# Patient Record
Sex: MALE | Race: BLACK OR AFRICAN AMERICAN | Employment: OTHER | URBAN - METROPOLITAN AREA
[De-identification: names, ages, dates, MRNs, and addresses within clinical notes are randomized per-mention and may not be internally consistent; named-entity substitution may affect disease eponyms.]

---

## 2018-09-06 ENCOUNTER — APPOINTMENT (OUTPATIENT)
Dept: CT IMAGING | Age: 63
DRG: 101 | End: 2018-09-06
Attending: EMERGENCY MEDICINE
Payer: MEDICARE

## 2018-09-06 ENCOUNTER — APPOINTMENT (OUTPATIENT)
Dept: GENERAL RADIOLOGY | Age: 63
DRG: 101 | End: 2018-09-06
Attending: EMERGENCY MEDICINE
Payer: MEDICARE

## 2018-09-06 ENCOUNTER — HOSPITAL ENCOUNTER (INPATIENT)
Age: 63
LOS: 1 days | Discharge: HOME | DRG: 101 | End: 2018-09-08
Attending: EMERGENCY MEDICINE | Admitting: INTERNAL MEDICINE
Payer: MEDICARE

## 2018-09-06 DIAGNOSIS — R56.9 SEIZURE (HCC): ICD-10-CM

## 2018-09-06 DIAGNOSIS — R41.82 ALTERED MENTAL STATUS, UNSPECIFIED ALTERED MENTAL STATUS TYPE: Primary | ICD-10-CM

## 2018-09-06 DIAGNOSIS — R41.0 DISORIENTATION: ICD-10-CM

## 2018-09-06 PROBLEM — I10 HYPERTENSION: Status: ACTIVE | Noted: 2018-09-06

## 2018-09-06 LAB
ALBUMIN SERPL-MCNC: 3.4 G/DL (ref 3.5–5)
ALBUMIN/GLOB SERPL: 0.8 {RATIO} (ref 1.1–2.2)
ALP SERPL-CCNC: 110 U/L (ref 45–117)
ALT SERPL-CCNC: 69 U/L (ref 12–78)
AMPHET UR QL SCN: NEGATIVE
ANION GAP SERPL CALC-SCNC: 6 MMOL/L (ref 5–15)
APAP SERPL-MCNC: <2 UG/ML (ref 10–30)
APPEARANCE UR: CLEAR
AST SERPL-CCNC: 67 U/L (ref 15–37)
ATRIAL RATE: 60 BPM
BARBITURATES UR QL SCN: POSITIVE
BASOPHILS # BLD: 0 K/UL (ref 0–0.1)
BASOPHILS NFR BLD: 0 % (ref 0–1)
BENZODIAZ UR QL: NEGATIVE
BILIRUB SERPL-MCNC: 0.2 MG/DL (ref 0.2–1)
BILIRUB UR QL: NEGATIVE
BUN SERPL-MCNC: 32 MG/DL (ref 6–20)
BUN/CREAT SERPL: 29 (ref 12–20)
CALCIUM SERPL-MCNC: 8.7 MG/DL (ref 8.5–10.1)
CALCULATED P AXIS, ECG09: 44 DEGREES
CALCULATED R AXIS, ECG10: -11 DEGREES
CALCULATED T AXIS, ECG11: 15 DEGREES
CANNABINOIDS UR QL SCN: NEGATIVE
CHLORIDE SERPL-SCNC: 105 MMOL/L (ref 97–108)
CO2 SERPL-SCNC: 28 MMOL/L (ref 21–32)
COCAINE UR QL SCN: NEGATIVE
COLOR UR: NORMAL
COMMENT, HOLDF: NORMAL
CREAT SERPL-MCNC: 1.1 MG/DL (ref 0.7–1.3)
DIAGNOSIS, 93000: NORMAL
DIFFERENTIAL METHOD BLD: ABNORMAL
DRUG SCRN COMMENT,DRGCM: ABNORMAL
EOSINOPHIL # BLD: 0.2 K/UL (ref 0–0.4)
EOSINOPHIL NFR BLD: 4 % (ref 0–7)
ERYTHROCYTE [DISTWIDTH] IN BLOOD BY AUTOMATED COUNT: 13.5 % (ref 11.5–14.5)
ETHANOL SERPL-MCNC: <10 MG/DL
GLOBULIN SER CALC-MCNC: 4.4 G/DL (ref 2–4)
GLUCOSE BLD STRIP.AUTO-MCNC: 103 MG/DL (ref 65–100)
GLUCOSE SERPL-MCNC: 93 MG/DL (ref 65–100)
GLUCOSE UR STRIP.AUTO-MCNC: NEGATIVE MG/DL
HCT VFR BLD AUTO: 35.1 % (ref 36.6–50.3)
HGB BLD-MCNC: 11.2 G/DL (ref 12.1–17)
HGB UR QL STRIP: NEGATIVE
IMM GRANULOCYTES # BLD: 0 K/UL (ref 0–0.04)
IMM GRANULOCYTES NFR BLD AUTO: 0 % (ref 0–0.5)
INR PPP: 1 (ref 0.9–1.1)
KETONES UR QL STRIP.AUTO: NEGATIVE MG/DL
LEUKOCYTE ESTERASE UR QL STRIP.AUTO: NEGATIVE
LYMPHOCYTES # BLD: 0.9 K/UL (ref 0.8–3.5)
LYMPHOCYTES NFR BLD: 22 % (ref 12–49)
MAGNESIUM SERPL-MCNC: 2.1 MG/DL (ref 1.6–2.4)
MCH RBC QN AUTO: 29.4 PG (ref 26–34)
MCHC RBC AUTO-ENTMCNC: 31.9 G/DL (ref 30–36.5)
MCV RBC AUTO: 92.1 FL (ref 80–99)
METHADONE UR QL: NEGATIVE
MONOCYTES # BLD: 0.8 K/UL (ref 0–1)
MONOCYTES NFR BLD: 21 % (ref 5–13)
NEUTS SEG # BLD: 2 K/UL (ref 1.8–8)
NEUTS SEG NFR BLD: 53 % (ref 32–75)
NITRITE UR QL STRIP.AUTO: NEGATIVE
NRBC # BLD: 0 K/UL (ref 0–0.01)
NRBC BLD-RTO: 0 PER 100 WBC
OPIATES UR QL: NEGATIVE
P-R INTERVAL, ECG05: 136 MS
PCP UR QL: NEGATIVE
PH UR STRIP: 5 [PH] (ref 5–8)
PHENYTOIN SERPL-MCNC: 2.8 UG/ML (ref 10–20)
PLATELET # BLD AUTO: 174 K/UL (ref 150–400)
PMV BLD AUTO: 10.4 FL (ref 8.9–12.9)
POTASSIUM SERPL-SCNC: 3.8 MMOL/L (ref 3.5–5.1)
PROT SERPL-MCNC: 7.8 G/DL (ref 6.4–8.2)
PROT UR STRIP-MCNC: NEGATIVE MG/DL
PROTHROMBIN TIME: 9.8 SEC (ref 9–11.1)
Q-T INTERVAL, ECG07: 434 MS
QRS DURATION, ECG06: 78 MS
QTC CALCULATION (BEZET), ECG08: 434 MS
RBC # BLD AUTO: 3.81 M/UL (ref 4.1–5.7)
RBC MORPH BLD: ABNORMAL
SALICYLATES SERPL-MCNC: <1.7 MG/DL (ref 2.8–20)
SAMPLES BEING HELD,HOLD: NORMAL
SERVICE CMNT-IMP: ABNORMAL
SODIUM SERPL-SCNC: 139 MMOL/L (ref 136–145)
SP GR UR REFRACTOMETRY: 1.01 (ref 1–1.03)
TROPONIN I SERPL-MCNC: <0.05 NG/ML
TSH SERPL DL<=0.05 MIU/L-ACNC: 1.81 UIU/ML (ref 0.36–3.74)
UROBILINOGEN UR QL STRIP.AUTO: 0.2 EU/DL (ref 0.2–1)
VENTRICULAR RATE, ECG03: 60 BPM
WBC # BLD AUTO: 3.9 K/UL (ref 4.1–11.1)

## 2018-09-06 PROCEDURE — 86922 COMPATIBILITY TEST ANTIGLOB: CPT | Performed by: EMERGENCY MEDICINE

## 2018-09-06 PROCEDURE — 80185 ASSAY OF PHENYTOIN TOTAL: CPT | Performed by: EMERGENCY MEDICINE

## 2018-09-06 PROCEDURE — 70450 CT HEAD/BRAIN W/O DYE: CPT

## 2018-09-06 PROCEDURE — 85025 COMPLETE CBC W/AUTO DIFF WBC: CPT | Performed by: EMERGENCY MEDICINE

## 2018-09-06 PROCEDURE — 99218 HC RM OBSERVATION: CPT

## 2018-09-06 PROCEDURE — 86921 COMPATIBILITY TEST INCUBATE: CPT | Performed by: EMERGENCY MEDICINE

## 2018-09-06 PROCEDURE — 85610 PROTHROMBIN TIME: CPT | Performed by: EMERGENCY MEDICINE

## 2018-09-06 PROCEDURE — 80307 DRUG TEST PRSMV CHEM ANLYZR: CPT | Performed by: EMERGENCY MEDICINE

## 2018-09-06 PROCEDURE — 86900 BLOOD TYPING SEROLOGIC ABO: CPT | Performed by: EMERGENCY MEDICINE

## 2018-09-06 PROCEDURE — 84484 ASSAY OF TROPONIN QUANT: CPT | Performed by: EMERGENCY MEDICINE

## 2018-09-06 PROCEDURE — 96374 THER/PROPH/DIAG INJ IV PUSH: CPT

## 2018-09-06 PROCEDURE — 71045 X-RAY EXAM CHEST 1 VIEW: CPT

## 2018-09-06 PROCEDURE — 74011250636 HC RX REV CODE- 250/636: Performed by: EMERGENCY MEDICINE

## 2018-09-06 PROCEDURE — 86905 BLOOD TYPING RBC ANTIGENS: CPT | Performed by: EMERGENCY MEDICINE

## 2018-09-06 PROCEDURE — 36415 COLL VENOUS BLD VENIPUNCTURE: CPT | Performed by: EMERGENCY MEDICINE

## 2018-09-06 PROCEDURE — 84443 ASSAY THYROID STIM HORMONE: CPT

## 2018-09-06 PROCEDURE — 93005 ELECTROCARDIOGRAM TRACING: CPT

## 2018-09-06 PROCEDURE — 86920 COMPATIBILITY TEST SPIN: CPT | Performed by: EMERGENCY MEDICINE

## 2018-09-06 PROCEDURE — 82962 GLUCOSE BLOOD TEST: CPT

## 2018-09-06 PROCEDURE — 99285 EMERGENCY DEPT VISIT HI MDM: CPT

## 2018-09-06 PROCEDURE — 81003 URINALYSIS AUTO W/O SCOPE: CPT | Performed by: EMERGENCY MEDICINE

## 2018-09-06 PROCEDURE — 86870 RBC ANTIBODY IDENTIFICATION: CPT | Performed by: EMERGENCY MEDICINE

## 2018-09-06 PROCEDURE — 80053 COMPREHEN METABOLIC PANEL: CPT | Performed by: EMERGENCY MEDICINE

## 2018-09-06 PROCEDURE — 83735 ASSAY OF MAGNESIUM: CPT | Performed by: EMERGENCY MEDICINE

## 2018-09-06 RX ORDER — LORAZEPAM 2 MG/ML
2 INJECTION INTRAMUSCULAR
Status: DISCONTINUED | OUTPATIENT
Start: 2018-09-06 | End: 2018-09-08 | Stop reason: HOSPADM

## 2018-09-06 RX ORDER — PHENYTOIN SODIUM 50 MG/ML
INJECTION, SOLUTION INTRAMUSCULAR; INTRAVENOUS
Status: DISPENSED
Start: 2018-09-06 | End: 2018-09-07

## 2018-09-06 RX ORDER — SODIUM CHLORIDE 0.9 % (FLUSH) 0.9 %
5-10 SYRINGE (ML) INJECTION EVERY 8 HOURS
Status: DISCONTINUED | OUTPATIENT
Start: 2018-09-06 | End: 2018-09-08 | Stop reason: HOSPADM

## 2018-09-06 RX ORDER — PHENYTOIN SODIUM 100 MG/1
300 CAPSULE, EXTENDED RELEASE ORAL
COMMUNITY
End: 2018-09-08

## 2018-09-06 RX ORDER — ENOXAPARIN SODIUM 100 MG/ML
40 INJECTION SUBCUTANEOUS EVERY 24 HOURS
Status: DISCONTINUED | OUTPATIENT
Start: 2018-09-06 | End: 2018-09-08 | Stop reason: HOSPADM

## 2018-09-06 RX ORDER — NALOXONE HYDROCHLORIDE 1 MG/ML
1 INJECTION INTRAMUSCULAR; INTRAVENOUS; SUBCUTANEOUS
Status: COMPLETED | OUTPATIENT
Start: 2018-09-06 | End: 2018-09-06

## 2018-09-06 RX ORDER — FUROSEMIDE 20 MG/1
20 TABLET ORAL DAILY
COMMUNITY
End: 2018-09-08

## 2018-09-06 RX ORDER — METOPROLOL TARTRATE 25 MG/1
TABLET, FILM COATED ORAL 2 TIMES DAILY
Status: ON HOLD | COMMUNITY
End: 2018-09-07 | Stop reason: DRUGHIGH

## 2018-09-06 RX ORDER — SODIUM CHLORIDE 0.9 % (FLUSH) 0.9 %
5-10 SYRINGE (ML) INJECTION AS NEEDED
Status: DISCONTINUED | OUTPATIENT
Start: 2018-09-06 | End: 2018-09-07 | Stop reason: SDUPTHER

## 2018-09-06 RX ORDER — PHENYTOIN SODIUM 100 MG/1
100 CAPSULE, EXTENDED RELEASE ORAL DAILY
Status: DISCONTINUED | OUTPATIENT
Start: 2018-09-07 | End: 2018-09-07

## 2018-09-06 RX ORDER — ATORVASTATIN CALCIUM 40 MG/1
40 TABLET, FILM COATED ORAL DAILY
Status: DISCONTINUED | OUTPATIENT
Start: 2018-09-07 | End: 2018-09-08 | Stop reason: HOSPADM

## 2018-09-06 RX ORDER — METOPROLOL TARTRATE 25 MG/1
25 TABLET, FILM COATED ORAL 2 TIMES DAILY
Status: DISCONTINUED | OUTPATIENT
Start: 2018-09-07 | End: 2018-09-07 | Stop reason: DRUGHIGH

## 2018-09-06 RX ORDER — ONDANSETRON 2 MG/ML
4 INJECTION INTRAMUSCULAR; INTRAVENOUS
Status: DISCONTINUED | OUTPATIENT
Start: 2018-09-06 | End: 2018-09-08 | Stop reason: HOSPADM

## 2018-09-06 RX ORDER — SIMVASTATIN 20 MG/1
20 TABLET, FILM COATED ORAL
COMMUNITY
End: 2018-09-08

## 2018-09-06 RX ORDER — PHENYTOIN SODIUM 50 MG/ML
750 INJECTION, SOLUTION INTRAMUSCULAR; INTRAVENOUS ONCE
Status: DISCONTINUED | OUTPATIENT
Start: 2018-09-06 | End: 2018-09-06

## 2018-09-06 RX ORDER — SODIUM CHLORIDE 0.9 % (FLUSH) 0.9 %
5-10 SYRINGE (ML) INJECTION AS NEEDED
Status: DISCONTINUED | OUTPATIENT
Start: 2018-09-06 | End: 2018-09-08 | Stop reason: HOSPADM

## 2018-09-06 RX ORDER — ACETAMINOPHEN 325 MG/1
650 TABLET ORAL
Status: DISCONTINUED | OUTPATIENT
Start: 2018-09-06 | End: 2018-09-08 | Stop reason: HOSPADM

## 2018-09-06 RX ADMIN — Medication 10 ML: at 23:25

## 2018-09-06 RX ADMIN — ENOXAPARIN SODIUM 40 MG: 100 INJECTION SUBCUTANEOUS at 23:25

## 2018-09-06 RX ADMIN — PHENYTOIN SODIUM 750 MG: 50 INJECTION INTRAMUSCULAR; INTRAVENOUS at 22:17

## 2018-09-06 RX ADMIN — NALOXONE HYDROCHLORIDE 1 MG: 1 INJECTION PARENTERAL at 21:13

## 2018-09-06 RX ADMIN — NALOXONE HYDROCHLORIDE 1 MG: 1 INJECTION PARENTERAL at 20:55

## 2018-09-06 NOTE — ED TRIAGE NOTES
Pt's family members dropped off pt stating that he has been slow to respond to questions and weak since yesterday, concerned he had a seizure. Pt weak and slightly confused in triage.

## 2018-09-06 NOTE — ED PROVIDER NOTES
EMERGENCY DEPARTMENT HISTORY AND PHYSICAL EXAM 
 
 
Date: 9/6/2018 Patient Name: Sujata Radford History of Presenting Illness Chief Complaint Patient presents with  Altered mental status History Provided By: Patient and Patient's Sister HPI: Sujata Radford, 70 y.o. male with PMHx significant for seizures, high cholesterol, and HTN, presents ambulatory to the ED with cc of a new onset AMS alongside generalized body weakness, HA, and off balance walking since yesterday. The pt's sister thinks that the pt may have experienced a stroke sometime yesterday. The pt's sister reports that the pt was released from half-way on 7/9/18 after serving 22 years at [facility]. The pt's sister states that the pt was fine upon release, and reports that it was not until yesterday that the pt began acting differently. The pt's sister states that the pt has become confused since then and keeps calling her other people's names. Additionally, the pt's sister notes that the pt keeps calling for his dead wife. The pt's sister also notes that the pt's speech has been delayed. The pt's sister reports that the pt's mental status is currently different than baseline. The pt's sister states that the pt's off balance walking became worse today. The pt's sister reports that the pt was complaining of a HA earlier. The pt's sister states that the pt has been taking his regular medications daily and has not taken any new medications recently. The pt's sister reports that the pt has endorsed taking ASA. The pt's sister notes that the pt has a plate in his head. The pt denies experiencing N/V/D or fever. PMHx: seizures, high cholesterol, and HTN 
PSHx: plate in head SHx: - EtOH, - tobacco, - illicit drugs The HPI is limited due to the a change in the pt's mental status. PCP: None Past History Past Medical History: 
Past Medical History:  
Diagnosis Date  High cholesterol  HTN (hypertension)  Seizure ([address] Past Surgical History: 
History reviewed. No pertinent surgical history. Family History: 
History reviewed. No pertinent family history. Social History: 
Social History Substance Use Topics  Smoking status: Unknown If Ever Smoked  Smokeless tobacco: None  Alcohol use None Allergies: 
No Known Allergies Review of Systems Review of Systems Unable to perform ROS: Mental status change Physical Exam  
Physical Exam  
Constitutional: He appears well-developed and well-nourished. HENT:  
Head: Normocephalic and atraumatic. Eyes: Conjunctivae and EOM are normal. Pupils are unequal (The R pupil is 1 mm and the L pupil is 3-4 mm). Neck: Neck supple. Cardiovascular: Normal rate, regular rhythm and intact distal pulses. Pulmonary/Chest: Effort normal and breath sounds normal. No accessory muscle usage. No respiratory distress. Abdominal: Soft. Normal appearance. There is no tenderness. Musculoskeletal: Normal range of motion. Right upper leg: He exhibits no edema. Left upper leg: He exhibits no edema. Right lower leg: He exhibits no edema. Left lower leg: He exhibits no edema. Neurological: He is alert. A&O x 1 No facial droop.  strength is equal bilaterally. Lifts both legs off of the bed, but not against resistance. Skin: Skin is warm and dry. No rash noted. Psychiatric:  
Mumbling his  wife's name. Nursing note and vitals reviewed. Diagnostic Study Results Labs - Recent Results (from the past 12 hour(s)) GLUCOSE, POC Collection Time: 18  7:06 PM  
Result Value Ref Range Glucose (POC) 103 (H) 65 - 100 mg/dL Performed by Hu Re SAMPLES BEING HELD Collection Time: 18  7:11 PM  
Result Value Ref Range SAMPLES BEING HELD 1sst,1BLU,1LAV,1DKGRN,1PST,1PINK COMMENT   Add-on orders for these samples will be processed based on acceptable specimen integrity and analyte stability, which may vary by analyte. TYPE & SCREEN Collection Time: 09/06/18  7:11 PM  
Result Value Ref Range Crossmatch Expiration 09/09/2018 ABO/Rh(D) B POSITIVE Antibody screen POS Antibody ID ANTI-M   
 ANTIGEN TYPING M NEGATIVE, PROTHROMBIN TIME + INR Collection Time: 09/06/18  7:11 PM  
Result Value Ref Range INR 1.0 0.9 - 1.1 Prothrombin time 9.8 9.0 - 11.1 sec CBC WITH AUTOMATED DIFF Collection Time: 09/06/18  7:11 PM  
Result Value Ref Range WBC 3.9 (L) 4.1 - 11.1 K/uL  
 RBC 3.81 (L) 4.10 - 5.70 M/uL  
 HGB 11.2 (L) 12.1 - 17.0 g/dL HCT 35.1 (L) 36.6 - 50.3 % MCV 92.1 80.0 - 99.0 FL  
 MCH 29.4 26.0 - 34.0 PG  
 MCHC 31.9 30.0 - 36.5 g/dL  
 RDW 13.5 11.5 - 14.5 % PLATELET 677 553 - 309 K/uL MPV 10.4 8.9 - 12.9 FL  
 NRBC 0.0 0  WBC ABSOLUTE NRBC 0.00 0.00 - 0.01 K/uL NEUTROPHILS 53 32 - 75 % LYMPHOCYTES 22 12 - 49 % MONOCYTES 21 (H) 5 - 13 % EOSINOPHILS 4 0 - 7 % BASOPHILS 0 0 - 1 % IMMATURE GRANULOCYTES 0 0.0 - 0.5 % ABS. NEUTROPHILS 2.0 1.8 - 8.0 K/UL  
 ABS. LYMPHOCYTES 0.9 0.8 - 3.5 K/UL  
 ABS. MONOCYTES 0.8 0.0 - 1.0 K/UL  
 ABS. EOSINOPHILS 0.2 0.0 - 0.4 K/UL  
 ABS. BASOPHILS 0.0 0.0 - 0.1 K/UL  
 ABS. IMM. GRANS. 0.0 0.00 - 0.04 K/UL  
 DF SMEAR SCANNED    
 RBC COMMENTS NORMOCYTIC, NORMOCHROMIC METABOLIC PANEL, COMPREHENSIVE Collection Time: 09/06/18  7:11 PM  
Result Value Ref Range Sodium 139 136 - 145 mmol/L Potassium 3.8 3.5 - 5.1 mmol/L Chloride 105 97 - 108 mmol/L  
 CO2 28 21 - 32 mmol/L Anion gap 6 5 - 15 mmol/L Glucose 93 65 - 100 mg/dL BUN 32 (H) 6 - 20 MG/DL Creatinine 1.10 0.70 - 1.30 MG/DL  
 BUN/Creatinine ratio 29 (H) 12 - 20 GFR est AA >60 >60 ml/min/1.73m2 GFR est non-AA >60 >60 ml/min/1.73m2 Calcium 8.7 8.5 - 10.1 MG/DL  Bilirubin, total 0.2 0.2 - 1.0 MG/DL  
 ALT (SGPT) 69 12 - 78 U/L  
 AST (SGOT) 67 (H) 15 - 37 U/L  
 Alk. phosphatase 110 45 - 117 U/L Protein, total 7.8 6.4 - 8.2 g/dL Albumin 3.4 (L) 3.5 - 5.0 g/dL Globulin 4.4 (H) 2.0 - 4.0 g/dL A-G Ratio 0.8 (L) 1.1 - 2.2 MAGNESIUM Collection Time: 09/06/18  7:11 PM  
Result Value Ref Range Magnesium 2.1 1.6 - 2.4 mg/dL TROPONIN I Collection Time: 09/06/18  7:11 PM  
Result Value Ref Range Troponin-I, Qt. <0.05 <0.05 ng/mL TSH 3RD GENERATION Collection Time: 09/06/18  7:11 PM  
Result Value Ref Range TSH 1.81 0.36 - 3.74 uIU/mL DRUG SCREEN, URINE Collection Time: 09/06/18  7:32 PM  
Result Value Ref Range AMPHETAMINES NEGATIVE  NEG    
 BARBITURATES POSITIVE (A) NEG BENZODIAZEPINES NEGATIVE  NEG    
 COCAINE NEGATIVE  NEG METHADONE NEGATIVE  NEG    
 OPIATES NEGATIVE  NEG    
 PCP(PHENCYCLIDINE) NEGATIVE  NEG    
 THC (TH-CANNABINOL) NEGATIVE  NEG Drug screen comment (NOTE) URINALYSIS W/ RFLX MICROSCOPIC Collection Time: 09/06/18  7:32 PM  
Result Value Ref Range Color STRAW Appearance CLEAR CLEAR Specific gravity 1.010 1.003 - 1.030    
 pH (UA) 5.0 5.0 - 8.0 Protein NEGATIVE  NEG mg/dL Glucose NEGATIVE  NEG mg/dL Ketone NEGATIVE  NEG mg/dL Bilirubin NEGATIVE  NEG Blood NEGATIVE  NEG Urobilinogen 0.2 0.2 - 1.0 EU/dL Nitrites NEGATIVE  NEG Leukocyte Esterase NEGATIVE  NEG    
EKG, 12 LEAD, INITIAL Collection Time: 09/06/18  7:39 PM  
Result Value Ref Range Ventricular Rate 60 BPM  
 Atrial Rate 60 BPM  
 P-R Interval 136 ms QRS Duration 78 ms Q-T Interval 434 ms QTC Calculation (Bezet) 434 ms Calculated P Axis 44 degrees Calculated R Axis -11 degrees Calculated T Axis 15 degrees Diagnosis Normal sinus rhythm with sinus arrhythmia Left axis deviation 
minimal changes of early repolarization in the precordium Confirmed by Anil Cancino MD, [facility] ([id number]) on 9/6/2018 9:16:24 PM 
  
ACETAMINOPHEN  Collection Time: 09/06/18  7:42 PM  
 Result Value Ref Range Acetaminophen level <2 (L) 10 - 30 ug/mL PHENYTOIN Collection Time: 09/06/18  7:42 PM  
Result Value Ref Range Phenytoin 2.8 (L) 10.0 - 20.0 ug/mL SALICYLATE Collection Time: 09/06/18  7:42 PM  
Result Value Ref Range Salicylate level <3.3 (L) 2.8 - 20.0 MG/DL  
ETHYL ALCOHOL Collection Time: 09/06/18  7:42 PM  
Result Value Ref Range ALCOHOL(ETHYL),SERUM <10 <10 MG/DL Radiologic Studies -  
CT Results  (Last 48 hours) 09/06/18 1940  CT HEAD WO CONT Final result Impression:  IMPRESSION:   
   
Chronic encephalomalacia in the anterior left frontal lobe. No acute intracranial process is identified. Narrative:  EXAM:  CT HEAD WO CONT Clinical history: Decreased alertness INDICATION:   Decreased alertness; anisocoria COMPARISON: None. CONTRAST:  None. TECHNIQUE: Unenhanced CT of the head was performed using 5 mm images. Brain and  
bone windows were generated. CT dose reduction was achieved through use of a  
standardized protocol tailored for this examination and automatic exposure  
control for dose modulation. FINDINGS:  
Encephalomalacia in the left frontal lobe is related to remote infarction/trauma [place] [facility] left frontal craniotomy. . There is no intracranial hemorrhage,  
extra-axial collection, mass, mass effect or midline shift. The basilar  
cisterns are open. No acute infarct is identified. The bone windows demonstrate  
no abnormalities. The visualized portions of the paranasal sinuses and mastoid  
air cells are clear. CXR Results  (Last 48 hours) 09/06/18 1940  XR CHEST PORT Final result Impression:  IMPRESSION:  
No acute process. Narrative:  PORTABLE CHEST RADIOGRAPH/S: 9/6/2018 7:40 PM  
   
Clinical history: Altered mental status INDICATION:   ams COMPARISON: None FINDINGS:  
 AP portable upright view of the chest demonstrates normal cardiopericardial  
silhouette. The lungs are adequately expanded. There is no edema, effusion,  
consolidation, or pneumothorax. The osseous structures are unremarkable. Patient  
is on a cardiac monitor. Medical Decision Making I am the first provider for this patient. I reviewed the vital signs, available nursing notes, past medical history, past surgical history, family history and social history. Vital Signs-Reviewed the patient's vital signs. Patient Vitals for the past 12 hrs: 
 Temp Pulse Resp BP SpO2  
09/06/18 2322 98.6 °F (37 °C) 64 18 109/72 96 % 09/06/18 2223 98.8 °F (37.1 °C) - - - -  
09/06/18 2200 - 60 14 107/73 98 % 09/06/18 2100 - (!) 59 18 120/69 99 % 09/06/18 2030 - (!) 58 22 120/65 100 % 09/06/18 1945 - - - 114/73 -  
09/06/18 1907 - - - 125/80 -  
09/06/18 1904 98.6 °F (37 °C) (!) 58 18 125/80 99 % Pulse Oximetry Analysis - 99% on RA Cardiac Monitor:  
Rate: 58 bpm 
Rhythm: Sinus bradycardia EKG interpretation: (Preliminary) (19:39:05) Rhythm: normal sinus rhythm; and regular . Rate (approx.): 60; Axis: normal; ND interval: normal; QRS interval: normal ; ST/T wave: normal; Other findings: normal. 
Written by Nura Ortega ED Scribe, as dictated by Andrew Hernández. Karlos Heimlich, MD . Records Reviewed: Nursing Notes and Old Medical Records Provider Notes (Medical Decision Making): DDx: prolonged postictal state, intracranial hemorrhage, CVA, electrolyte abnormality, sepsis, ingestion/drug use ED Course:  
Initial assessment performed. The patients presenting problems have been discussed, and they are in agreement with the care plan formulated and outlined with them. I have encouraged them to ask questions as they arise throughout their visit. PROGRESS NOTE: 
9:14 PM 
The pt is now A&O x 3 but is not yet fully back to baseline. Written by Marita Mckinley ED Scribe, as dictated by Deb Salamanca. Tory Michaels MD. 
 
CONSULT NOTE:  
9:54 PM 
Deb Salamanca. Tory Michaels MD  spoke with Dr. Ami Adam, Specialty: Hospitalist 
Discussed pt's hx, disposition, and available diagnostic and imaging results. Reviewed care plans. Consultant will evaluate pt for admission. Written by Marita Mckinley ED Scribe, as dictated by Deb Salamanca. Tory Michaels MD . Critical Care Time: 0 minutes Disposition: 
Admit: 
9:54 PM 
Patient is being admitted to the hospital by Dr. Ami Adam. The results of their tests and reasons for their admission have been discussed with them and/or available family. They convey agreement and understanding for the need to be admitted and for their admission diagnosis. Consultation has been made with the inpatient physician specialist for hospitalization. Written by Marita Mckinley ED Scribe, as dictated by Deb Salamanca. Tory Michaels MD . PLAN: 
1. Admit to the hospital.   
Diagnosis Clinical Impression: 1. Altered mental status, unspecified altered mental status type Attestation: This note is prepared by Marita Mckinley, acting as Scribe for Deb Salamanca. Tory Michaels MD.  
 
Deb Salamanca. Tory Michaels MD: The scribe's documentation has been prepared under my direction and personally reviewed by me in its entirety. I confirm that the note above accurately reflects all work, treatment, procedures, and medical decision making performed by me.

## 2018-09-06 NOTE — ED NOTES
Pt arrived to ED with family members with c/o altered mental status. Per family members, pt started to be confused last night. Family members stated that he was complaining of a headache. Pt is O&AX2. Pt is unsure of time and place. Pt answers questions but speech is delayed. Family members report a PMH of seizures, high cholesterol, and HTN. Pt is drowsy but is easily aroused to voice. Skin is intact; lungs are clear; pt breathes well on room air; Pt is in no acute distress. Will continue to monitor. See nursing assessment. Safety precautions in place; call light within reach. Seizure pads in place. RN assessment is limited d/t pt condition. Emergency Department Nursing Plan of Care The Nursing Plan of Care is developed from the Nursing assessment and Emergency Department Attending provider initial evaluation. The plan of care may be reviewed in the ED Provider note. The Plan of Care was developed with the following considerations:  
Patient / Family readiness to learn indicated by:verbalized understanding Persons(s) to be included in education: patient and family Barriers to Learning/Limitations:No 
 
Signed Lin Kellogg, MIREYA   
9/6/2018   7:15 PM

## 2018-09-06 NOTE — IP AVS SNAPSHOT
[address] [facility] 
 
 
 [address] [facility] Patient: Anneliese Araujo MRN: [id number] SWK:[birth year] About your hospitalization You were admitted on:  September 6, 2018 You last received care in the:  [address] You were discharged on:  September 8, 2018 Why you were hospitalized Your primary diagnosis was: Altered Mental Status Your diagnoses also included:  Hypertension, Seizure (Hcc), Altered Mental State Follow-up Information Follow up With Details Comments Contact Info Eugenio Shanks MD   [address] [facility] [address] [facility] 
[phone] Christiano Kapadia MD   [facility] [address] [facility] [address] 
[phone] None   None (395) Patient stated that they have no PCP Discharge Orders None A check missael indicates which time of day the medication should be taken. My Medications START taking these medications Instructions Each Dose to Equal  
 Morning Noon Evening Bedtime  
 aspirin 325 mg tablet Commonly known as:  ASPIRIN Start taking on:  9/9/2018 Your next dose is:  Tomorrow 09/09/18 Take 1 Tab by mouth daily for 30 days. 325 mg  
    
   
   
   
  
 atorvastatin 40 mg tablet Commonly known as:  LIPITOR Start taking on:  9/9/2018 Replaces:  simvastatin 20 mg tablet Your next dose is:  Tomorrow 09/09/18 Take 1 Tab by mouth daily for 30 days. 40 mg  
    
   
   
   
  
 divalproex  mg ER tablet Commonly known as:  DEPAKOTE ER Your next dose is: Tonight 09/08/18 Take 2 Tabs by mouth nightly for 30 days. 1000 mg CHANGE how you take these medications Instructions Each Dose to Equal  
 Morning Noon Evening Bedtime * metoprolol succinate 25 mg XL tablet Commonly known as:  TOPROL-XL What changed:  Another medication with the same name was added.  Make sure you understand how and when to take each. Take 25 mg by mouth daily. 25 mg  
    
   
   
   
  
 * metoprolol succinate 25 mg XL tablet Commonly known as:  TOPROL-XL Start taking on:  9/9/2018 What changed: You were already taking a medication with the same name, and this prescription was added. Make sure you understand how and when to take each. Your next dose is:  Tomorrow morning 09/09/18 Take 1 Tab by mouth daily for 30 days. 25 mg  
    
   
   
   
  
 * Notice: This list has 2 medication(s) that are the same as other medications prescribed for you. Read the directions carefully, and ask your doctor or other care provider to review them with you. STOP taking these medications DILANTIN EXTENDED 100 mg ER capsule Generic drug:  phenytoin ER  
   
  
 LASIX 20 mg tablet Generic drug:  furosemide  
   
  
 metoprolol tartrate 25 mg tablet Commonly known as:  LOPRESSOR  
   
  
 simvastatin 20 mg tablet Commonly known as:  ZOCOR Replaced by:  atorvastatin 40 mg tablet Where to Get Your Medications These medications were sent to [address] [place] [address] Phone:  [phone]  
  aspirin 325 mg tablet  
 atorvastatin 40 mg tablet  
 divalproex  mg ER tablet  
 metoprolol succinate 25 mg XL tablet Discharge Instructions None [facility] Announcement We are excited to announce that we are making your provider's discharge notes available to you in [email or web address]. You will see these notes when they are completed and signed by the physician that discharged you from your recent hospital stay. If you have any questions or concerns about any information you see in VocalizeLocalt, please call the Health Information Department where you were seen or reach out to your Primary Care Provider for more information about your plan of care. Introducing [facility]! [employer] introduces Talkwheelt patient portal. Now you can access parts of your medical record, email your doctor's office, and request medication refills online. 1. In your internet browser, go to [email or web address] 2. Click on the First Time User? Click Here link in the Sign In box. You will see the New Member Sign Up page. 3. Enter your Solavista Access Code exactly as it appears below. You will not need to use this code after youve completed the sign-up process. If you do not sign up before the expiration date, you must request a new code. · Solavista Access Code: [email or web address] Expires: 12/5/2018  7:04 PM 
 
4. Enter the last four digits of your Social Security Number (xxxx) and Date of Birth (mm/dd/yyyy) as indicated and click Submit. You will be taken to the next sign-up page. 5. Create a Solavista ID. This will be your Solavista login ID and cannot be changed, so think of one that is secure and easy to remember. 6. Create a Solavista password. You can change your password at any time. 7. Enter your Password Reset Question and Answer. This can be used at a later time if you forget your password. 8. Enter your e-mail address. You will receive e-mail notification when new information is available in [address] 9. Click Sign Up. You can now view and download portions of your medical record. 10. Click the Download Summary menu link to download a portable copy of your medical information. If you have questions, please visit the Frequently Asked Questions section of the Solavista website. Remember, Solavista is NOT to be used for urgent needs. For medical emergencies, dial 911. Now available from your iPhone and Android! Introducing Dexter Rubio As a [employer] patient, I wanted to make you aware of our electronic visit tool called Dexter Rubio. [employer] 24/7 allows you to connect within minutes with a medical provider 24 hours a day, seven days a week via a mobile device or tablet or logging into a secure website from your computer. You can access [facility] from anywhere in the [place]. A virtual visit might be right for you when you have a simple condition and feel like you just dont want to get out of bed, or cant get away from work for an appointment, when your regular [facility] provider is not available (evenings, weekends or holidays), or when youre out of town and need minor care. Electronic visits cost only $49 and if the [employer] 24/Scodix provider determines a prescription is needed to treat your condition, one can be electronically transmitted to a nearby pharmacy*. Please take a moment to enroll today if you have not already done so. The enrollment process is free and takes just a few minutes. To enroll, please download the Eclector kathryn to your tablet or phone, or visit [email or web address] to enroll on your computer. And, as an [address] [place] [address] patient with a Mdundo account, the results of your visits will be scanned into your electronic medical record and your primary care provider will be able to view the scanned results. We urge you to continue to see your regular [facility] provider for your ongoing medical care. And while your primary care provider may not be the one available when you seek a Dexter Rubio virtual visit, the peace of mind you get from getting a real diagnosis real time can be priceless. For more information on Dexter Humansizedgueritafin, view our Frequently Asked Questions (FAQs) at [email or web address]. Sincerely, 
 
Drew Manuel MD 
Chief Medical Officer Zachary Reynolds *:  certain medications cannot be prescribed via Valence TechnologygueritaMusicplayr Unresulted Labs-Please follow up with your PCP about these lab tests Order Current Status ANGIOTENSIN CONVERTING ENZYME In process VITAMIN D, 1, 25 DIHYDROXY In process Providers Seen During Your Hospitalization Provider Specialty Primary office phone Pearl Arizmendi MD Emergency Medicine [phone] Ronnie Vale MD Emergency Medicine [phone] Kinjal Salgado MD Hospitalist [phone] Your Primary Care Physician (PCP) Primary Care Physician Office Phone Office Fax NONE ** None ** ** None ** You are allergic to the following No active allergies Recent Documentation Height Weight BMI Smoking Status 1.778 m 63 kg 19.94 kg/m2 Unknown If Ever Smoked Emergency Contacts Name Discharge Info Relation Home Work Mobile Analy MéndzeOfe Patient Belongings The following personal items are in your possession at time of discharge: 
  Dental Appliances: Partials, Lowers, At home  Visual Aid: None      Home Medications: None   Jewelry: None  Clothing: At bedside, Pants, Shirt, Socks, Footwear    Other Valuables: None Discharge Instructions Attachments/References DIVALPROEX (BY MOUTH) (ENGLISH) Patient Handouts Divalproex (By mouth) Divalproex Sodium (dye-EDMUND-proe-ex PARKER-ashly-um) Treats seizures. Also treats bipolar disorder and helps prevent migraine headaches. Brand Name(s): Depakote, Depakote ER, Depakote Sprinkles There may be other brand names for this medicine. When This Medicine Should Not Be Used: This medicine is not right for everyone. Do not use it if you had an allergic reaction to divalproex, valproate sodium, valproic acid, if you are pregnant, or if you have certain genetic disorders (such as urea cycle disorder or mitochondrial disorders). How to Use This Medicine:  
Delayed Release Capsule, Delayed Release Tablet, Coated Tablet, Long Acting Tablet · Take your medicine as directed. Your dose may need to be changed several times to find what works best for you. · You may take this medicine with food to decrease stomach upset. · Capsule, tablet, or extended-release tablet: Swallow the medicine whole. Do not crush, break, or chew it. · Sprinkle capsule: You may open the capsule and pour the medicine into a small amount of soft food such as pudding or applesauce. Stir this mixture well and swallow it without chewing. · This medicine should come with a Medication Guide. Ask your pharmacist for a copy if you do not have one. · Missed dose: Take a dose as soon as you remember. If it is almost time for your next dose, wait until then and take a regular dose. Do not take extra medicine to make up for a missed dose. If you miss 2 or more doses, call your doctor. · Store the medicine in a closed container at room temperature, away from heat, moisture, and direct light. Drugs and Foods to Avoid: Ask your doctor or pharmacist before using any other medicine, including over-the-counter medicines, vitamins, and herbal products. · Some medicines can affect how divalproex sodium works. Tell your doctor if you are using any of the following: ¨ Amitriptyline, aspirin, clonazepam, diazepam, nortriptyline, propofol, rifampin, ritonavir, rufinamide, tolbutamide, or zidovudine ¨ Birth control pill ¨ Blood thinner (including warfarin) ¨ Carbapenem antibiotic (including ertapenem, imipenem, meropenem) ¨ Other seizure medicines (including carbamazepine, ethosuximide, felbamate, lamotrigine, phenobarbital, phenytoin, primidone, topiramate) · Alcohol, narcotic pain relievers, or sleeping pills may cause you to feel more lightheaded, dizzy, or faint when used with this medicine. Warnings While Using This Medicine: · It is not safe to take this medicine during pregnancy. It could harm an unborn baby. Tell your doctor right away if you become pregnant.  
· Tell your doctor if you are breastfeeding, or if you have kidney disease, liver disease, a blood disease, or pancreas problems, or a history of depression or mental health problems. · This medicine may cause the following problems: 
¨ Liver problems ¨ Pancreatitis ¨ Hyperammonemic encephalopathy (too much ammonia in your blood) ¨ Depression or thoughts of suicide ¨ Thrombocytopenia (decrease in blood cells that affect clotting) ¨ Hypothermia (low body temperature) ¨ Drug reaction with eosinophilia and systemic symptoms (DRESS), which may damage organs such as the liver, kidney, or heart · This medicine may make you dizzy or drowsy. Do not drive or do anything that could be dangerous until you know how this medicine affects you. · Do not stop using this medicine suddenly. Your doctor will need to slowly decrease your dose before you stop it completely. · Tell any doctor or dentist who treats you that you are using this medicine. This medicine may affect certain medical test results. · Your doctor will check your progress and the effects of this medicine at regular visits. Keep all appointments. · Keep all medicine out of the reach of children. Never share your medicine with anyone. Possible Side Effects While Using This Medicine:  
Call your doctor right away if you notice any of these side effects: · Allergic reaction: Itching or hives, swelling in your face or hands, swelling or tingling in your mouth or throat, chest tightness, trouble breathing · Blistering, peeling, red skin rash · Confusion, problems with memory, unusual drowsiness, clumsiness · Dark urine or pale stools, loss of appetite, stomach pain, yellow skin or eyes · Fever, rash, swollen glands in the neck, armpit, or groin · Sudden and severe stomach pain, nausea, vomiting, lightheadedness · Thoughts of hurting yourself, depression, unusual changes in behavior or moods · Unusual bleeding, bruising, or weakness If you notice these less serious side effects, talk with your doctor: · Diarrhea, stomach upset · Hair loss · Tiredness, sleepiness · Trouble sleeping, tremor · Vision changes, dizziness, headache If you notice other side effects that you think are caused by this medicine, tell your doctor. Call your doctor for medical advice about side effects. You may report side effects to FDA at 1-386-FDA-2081 © 2017 [address] Information is for End User's use only and may not be sold, redistributed or otherwise used for commercial purposes. The above information is an  only. It is not intended as medical advice for individual conditions or treatments. Talk to your doctor, nurse or pharmacist before following any medical regimen to see if it is safe and effective for you. Please provide this summary of care documentation to your next provider. Signatures-by signing, you are acknowledging that this After Visit Summary has been reviewed with you and you have received a copy. Patient Signature:  ____________________________________________________________ Date:  ____________________________________________________________  
  
Anna Marie Johanna Provider Signature:  ____________________________________________________________ Date:  ____________________________________________________________

## 2018-09-07 PROBLEM — R41.82 ALTERED MENTAL STATE: Status: ACTIVE | Noted: 2018-09-07

## 2018-09-07 LAB
ALBUMIN SERPL-MCNC: 3.2 G/DL (ref 3.5–5)
ALBUMIN/GLOB SERPL: 0.7 {RATIO} (ref 1.1–2.2)
ALP SERPL-CCNC: 104 U/L (ref 45–117)
ALT SERPL-CCNC: 81 U/L (ref 12–78)
AMMONIA PLAS-SCNC: 21 UMOL/L
AST SERPL-CCNC: 78 U/L (ref 15–37)
BILIRUB DIRECT SERPL-MCNC: 0.1 MG/DL (ref 0–0.2)
BILIRUB SERPL-MCNC: 0.4 MG/DL (ref 0.2–1)
CHOLEST SERPL-MCNC: 232 MG/DL
CK SERPL-CCNC: 281 U/L (ref 39–308)
CRP SERPL HS-MCNC: 2.1 MG/L
ERYTHROCYTE [SEDIMENTATION RATE] IN BLOOD: 25 MM/HR (ref 0–20)
GLOBULIN SER CALC-MCNC: 4.5 G/DL (ref 2–4)
HCYS SERPL-SCNC: 11.8 UMOL/L (ref 3.7–13.9)
HDLC SERPL-MCNC: 85 MG/DL
HDLC SERPL: 2.7 {RATIO} (ref 0–5)
LDLC SERPL CALC-MCNC: 105.2 MG/DL (ref 0–100)
LIPID PROFILE,FLP: ABNORMAL
PROT SERPL-MCNC: 7.7 G/DL (ref 6.4–8.2)
RPR SER QL: NONREACTIVE
TRIGL SERPL-MCNC: 209 MG/DL (ref ?–150)
VIT B12 SERPL-MCNC: 381 PG/ML (ref 193–986)
VLDLC SERPL CALC-MCNC: 41.8 MG/DL

## 2018-09-07 PROCEDURE — 82550 ASSAY OF CK (CPK): CPT | Performed by: PSYCHIATRY & NEUROLOGY

## 2018-09-07 PROCEDURE — 86141 C-REACTIVE PROTEIN HS: CPT | Performed by: PSYCHIATRY & NEUROLOGY

## 2018-09-07 PROCEDURE — 82140 ASSAY OF AMMONIA: CPT

## 2018-09-07 PROCEDURE — G8979 MOBILITY GOAL STATUS: HCPCS | Performed by: PHYSICAL THERAPIST

## 2018-09-07 PROCEDURE — 74011250637 HC RX REV CODE- 250/637: Performed by: INTERNAL MEDICINE

## 2018-09-07 PROCEDURE — 74011250636 HC RX REV CODE- 250/636: Performed by: EMERGENCY MEDICINE

## 2018-09-07 PROCEDURE — 85652 RBC SED RATE AUTOMATED: CPT | Performed by: PSYCHIATRY & NEUROLOGY

## 2018-09-07 PROCEDURE — 82652 VIT D 1 25-DIHYDROXY: CPT | Performed by: PSYCHIATRY & NEUROLOGY

## 2018-09-07 PROCEDURE — 99218 HC RM OBSERVATION: CPT

## 2018-09-07 PROCEDURE — 36415 COLL VENOUS BLD VENIPUNCTURE: CPT | Performed by: PSYCHIATRY & NEUROLOGY

## 2018-09-07 PROCEDURE — 97116 GAIT TRAINING THERAPY: CPT | Performed by: PHYSICAL THERAPIST

## 2018-09-07 PROCEDURE — 82164 ANGIOTENSIN I ENZYME TEST: CPT | Performed by: PSYCHIATRY & NEUROLOGY

## 2018-09-07 PROCEDURE — 80076 HEPATIC FUNCTION PANEL: CPT | Performed by: PSYCHIATRY & NEUROLOGY

## 2018-09-07 PROCEDURE — G8978 MOBILITY CURRENT STATUS: HCPCS | Performed by: PHYSICAL THERAPIST

## 2018-09-07 PROCEDURE — 74011250637 HC RX REV CODE- 250/637: Performed by: EMERGENCY MEDICINE

## 2018-09-07 PROCEDURE — 86592 SYPHILIS TEST NON-TREP QUAL: CPT | Performed by: PSYCHIATRY & NEUROLOGY

## 2018-09-07 PROCEDURE — 97161 PT EVAL LOW COMPLEX 20 MIN: CPT | Performed by: PHYSICAL THERAPIST

## 2018-09-07 PROCEDURE — 80061 LIPID PANEL: CPT | Performed by: PSYCHIATRY & NEUROLOGY

## 2018-09-07 PROCEDURE — 83090 ASSAY OF HOMOCYSTEINE: CPT | Performed by: PSYCHIATRY & NEUROLOGY

## 2018-09-07 PROCEDURE — 82607 VITAMIN B-12: CPT | Performed by: PSYCHIATRY & NEUROLOGY

## 2018-09-07 PROCEDURE — 65220000003 HC RM SEMIPRIVATE PSYCH

## 2018-09-07 PROCEDURE — 95816 EEG AWAKE AND DROWSY: CPT | Performed by: PSYCHIATRY & NEUROLOGY

## 2018-09-07 RX ORDER — IBUPROFEN 400 MG/1
800 TABLET ORAL
Status: DISCONTINUED | OUTPATIENT
Start: 2018-09-07 | End: 2018-09-08 | Stop reason: HOSPADM

## 2018-09-07 RX ORDER — METOPROLOL SUCCINATE 25 MG/1
25 TABLET, EXTENDED RELEASE ORAL DAILY
Status: DISCONTINUED | OUTPATIENT
Start: 2018-09-08 | End: 2018-09-08 | Stop reason: HOSPADM

## 2018-09-07 RX ORDER — FOLIC ACID 1 MG/1
1 TABLET ORAL DAILY
Status: DISCONTINUED | OUTPATIENT
Start: 2018-09-08 | End: 2018-09-08 | Stop reason: HOSPADM

## 2018-09-07 RX ORDER — LANOLIN ALCOHOL/MO/W.PET/CERES
3 CREAM (GRAM) TOPICAL
Status: DISCONTINUED | OUTPATIENT
Start: 2018-09-07 | End: 2018-09-08 | Stop reason: HOSPADM

## 2018-09-07 RX ORDER — DIVALPROEX SODIUM 500 MG/1
1000 TABLET, EXTENDED RELEASE ORAL
Status: DISCONTINUED | OUTPATIENT
Start: 2018-09-07 | End: 2018-09-08 | Stop reason: HOSPADM

## 2018-09-07 RX ORDER — METOPROLOL SUCCINATE 25 MG/1
25 TABLET, EXTENDED RELEASE ORAL DAILY
COMMUNITY
End: 2018-09-19 | Stop reason: SDUPTHER

## 2018-09-07 RX ORDER — ASPIRIN 325 MG
325 TABLET ORAL DAILY
Status: DISCONTINUED | OUTPATIENT
Start: 2018-09-08 | End: 2018-09-08 | Stop reason: HOSPADM

## 2018-09-07 RX ADMIN — Medication 10 ML: at 06:01

## 2018-09-07 RX ADMIN — ATORVASTATIN CALCIUM 40 MG: 40 TABLET, FILM COATED ORAL at 09:47

## 2018-09-07 RX ADMIN — ENOXAPARIN SODIUM 40 MG: 100 INJECTION SUBCUTANEOUS at 21:17

## 2018-09-07 RX ADMIN — ACETAMINOPHEN 650 MG: 325 TABLET ORAL at 07:43

## 2018-09-07 RX ADMIN — DIVALPROEX SODIUM 1000 MG: 500 TABLET, FILM COATED, EXTENDED RELEASE ORAL at 21:18

## 2018-09-07 RX ADMIN — Medication 10 ML: at 16:49

## 2018-09-07 RX ADMIN — Medication 10 ML: at 21:18

## 2018-09-07 NOTE — PROGRESS NOTES
Problem: Seizure Disorder (Adult) Goal: Interventions Outcome: Progressing Towards Goal 
Loading dose of seizure medication initiated, pt placed on seizure precautions. 3/4 siderails up with rail guards. Pt near nurses station with bed alarm on. Will cont to monitor. Problem: Falls - Risk of 
Goal: *Absence of Falls Document Elizabeth Gregory Fall Risk and appropriate interventions in the flowsheet. Outcome: Progressing Towards Goal 
Fall Risk Interventions: 
Mobility Interventions: Assess mobility with egress test, Bed/chair exit alarm Mentation Interventions: Adequate sleep, hydration, pain control, Door open when patient unattended, Bed/chair exit alarm

## 2018-09-07 NOTE — PROGRESS NOTES
Problem: Mobility Impaired (Adult and Pediatric) Goal: *Acute Goals and Plan of Care (Insert Text) Physical Therapy Goals Initiated 9/7/2018 1. Patient will move from supine to sit and sit to supine  in bed with independence within 7 day(s). 2.  Patient will transfer from bed to chair and chair to bed with supervision/set-up using the least restrictive device within 7 day(s). 3.  Patient will perform sit to stand with supervision/set-up within 7 day(s). 4.  Patient will ambulate with supervision/set-up for 300 feet with the least restrictive device within 7 day(s). 5.  Patient will improve MASCORRO balance scale score by 7 points within 7 days. physical Therapy EVALUATION- neuro population Patient: Lucy Mathew (1 y.o. male) Date: 9/7/2018 Primary Diagnosis: Altered mental status Altered mental state Altered mental state Precautions:   
 
ASSESSMENT : 
Based on the objective data described below, the patient presents with decreased balance, activity tolerance, and overall functional mobility. Patient reports history of left UE/LE weakness. Patient out of bed in chair at start of session. Patient required stand by to contact guard assist for sit to stand transfers. Patient ambulated 200 feet with minimal assistance and no device. Patient started the walk with narrow base of support, slow pace, and decreased stride length. Patient responded well to verbal cues of increasing stride length and increasing base of support. Patient's balance improved as distance increased and as he was able to utilize cues. Patient would benefit from a straight cane for ambulation. He states that his sister has an extra cane that he can use. Patient educated on use of straight cane in right hand with ambulation in order to improve balance; verbalized good understanding. Patient will benefit from skilled intervention to address the above impairments. Patients rehabilitation potential is considered to be Good Factors which may influence rehabilitation potential include:  
[x]           None noted 
[]           Mental ability/status []           Medical condition 
[]           Home/family situation and support systems 
[]           Safety awareness 
[]           Pain tolerance/management 
[]           Other: PLAN : 
Recommendations and Planned Interventions: 
[x]             Bed Mobility Training             [x]      Neuromuscular Re-Education [x]             Transfer Training                   []      Orthotic/Prosthetic Training 
[x]             Gait Training                         []      Modalities [x]             Therapeutic Exercises           []      Edema Management/Control [x]             Therapeutic Activities            [x]      Patient and Family Training/Education []             Other (comment): Frequency/Duration: Patient will be followed by physical therapy 2 times a week to address goals. Discharge Recommendations: Home Health Further Equipment Recommendations for Discharge: Patient reports that his sister has an extra straight cane that he can use. Recommend use of straight cane in R hand to improve balance SUBJECTIVE:  
Patient stated It's always been weaker on my left side.  OBJECTIVE DATA SUMMARY:  
HISTORY:   
Past Medical History:  
Diagnosis Date  High cholesterol  HTN (hypertension)  Seizure ([facility] [address] History reviewed. No pertinent surgical history. Prior Level of Function/Home Situation: Patient resides with his sister in a two story home with 15 steps to the second floor and unilateral handrail. Patient reports history of left UE/LE weakness. No use of assistive device reported. Home Situation Home Environment: Private residence One/Two Story Residence: Two story Living Alone: No 
Support Systems: Family member(s) Patient Expects to be Discharged to[de-identified] Private residence Current DME Used/Available at Home: None EXAMINATION/PRESENTATION/DECISION MAKING:  
Critical Behavior: 
Neurologic State: Alert Orientation Level: Oriented X4 Cognition: Follows commands Hearing: Auditory Auditory Impairment: None Range Of Motion: 
AROM: Within functional limits (except left shoulder) PROM: Within functional limits Strength:   
Strength: Generally decreased, functional (LUE/LLE weaker than right; right UE/LE 5/5) 5/5 RUE/RLE 
3+/5 LUE/LLE Coordination: 
Coordination: Within functional limits Functional Mobility: 
Bed Mobility: 
 Patient sitting out in chair at start of session. Transfers: 
Sit to Stand: Stand-by assistance Stand to Sit: Stand-by assistance Balance:  
Sitting: Intact Standing: Impaired; Without support Standing - Static: Good Standing - Dynamic : Fair Ambulation/Gait Training: 
Distance (ft): 200 Feet (ft) Assistive Device: Gait belt Ambulation - Level of Assistance: Contact guard assistance;Minimal assistance;Assist x1 Gait Description (WDL): Exceptions to [facility] Gait Abnormalities: Decreased step clearance Base of Support: Narrowed Speed/Mari: Slow Step Length: Right shortened;Left shortened Functional Measure Case Balance Test: 
 
Sitting to Standin Standing Unsupported: 4 Sitting with Back Unsupported: 4 Standing to Sittin Transfers: 1 Standing Unsupported with Eyes Closed: 3 Standing Unsupported with Feet Together: 3 Reach Forward with Outstretched Arm: 3  Object: 2 Turn to Look Over Shoulders: 4 Turn 360 Degrees: 2 Alternate Foot on Step/Stool: 1 Standing Unsupported One Foot in Front: 2 Stand on One Le Total: 35 
  
 
 
56=Maximum possible score;  
0-20=High fall risk 21-40=Moderate fall risk 41-56=Low fall risk Case Balance Test and G-code impairment scale: 
Percentage of Impairment CH 
 
0% 
 CI 
 
1-19% CJ 
 
20-39% CK 
 
40-59% CL 
 
60-79% CM 
 
80-99% CN  
 
100% Mascorro  
Score 0-56 56 45-55 34-44 23-33 12-22 1-11 0  
 
 
G codes: In compliance with CMSs Claims Based Outcome Reporting, the following G-code set was chosen for this patient based on their primary functional limitation being treated: The outcome measure chosen to determine the severity of the functional limitation was the MASCORRO with a score of 35/56 which was correlated with the impairment scale. ? Mobility - Walking and Moving Around:  
  - CURRENT STATUS: CJ - 20%-39% impaired, limited or restricted  - GOAL STATUS:  CI - 1%-19% impaired, limited or restricted  - D/C STATUS:  ---------------To be determined--------------- Based on the above components, the patient evaluation is determined to be of the following complexity level: LOW Pain: 
Pain Scale 1: Numeric (0 - 10) Pain Intensity 1: 0 Pain Location 1: Head 
Pain Intervention(s) 1: Medication (see MAR) Activity Tolerance:  
Good Please refer to the flowsheet for vital signs taken during this treatment. After treatment:  
[x]     Patient left in no apparent distress sitting up in chair 
[]     Patient left in no apparent distress in bed 
[x]     Call bell left within reach [x]     Nursing notified 
[]     Caregiver present 
[]     Bed alarm activated COMMUNICATION/EDUCATION:  
The patients plan of care was discussed with: Registered Nurse. Patient and/or family was verbally educated on the BE FAST acronym for signs/symptoms of CVA and TIA. BE FAST was written on patient's communication board  for visual education and reinforcement. All questions answered with patient indicating good understanding. [x]  Fall prevention education was provided and the patient/caregiver indicated understanding. [x]  Patient/family have participated as able in goal setting and plan of care. [x]  Patient/family agree to work toward stated goals and plan of care. []  Patient understands intent and goals of therapy, but is neutral about his/her participation. []  Patient is unable to participate in goal setting and plan of care. Thank you for this referral. 
Lacy Wood, PT Time Calculation: 19 mins

## 2018-09-07 NOTE — ED NOTES
Autumn Kang (sister) can be reached at [phone] and Lakeshia Landin can be reached at [phone].

## 2018-09-07 NOTE — PROGRESS NOTES
[facility] Admission Pharmacy Medication Reconciliation Recommendations/Findings: 
 
1) Permission from patient to speak to sister, Dafne Moseley, via telephone ([phone] for current medication list 
2) Changes: - Metoprolol tartrate 25 mg po BID changed to Metoprolol succinate ER 25 mg po DAILY 
       - Phenytoin  mg caps Take 2 caps (200 mg) po QHS changed to Take 3 caps (300 mg) po QHS [sister stated on Tuesday that doctor's office instructed patient to increase dose from 2 caps to 3 caps, but patient ran out (not enough) because of previous prescription instructions and had issues with insurance company and outpatient pharmacy filling due to days' supply based on previous dose Information obtained from: Patient's sister, Dafne Moseley, via telephone ([phone]) Past Medical History/Disease States: 
Past Medical History:  
Diagnosis Date  High cholesterol  HTN (hypertension)  Seizure ([address] Patient allergies: Allergies as of 09/06/2018  (No Known Allergies) Prior to Admission Medications Prescriptions Last Dose Informant Patient Reported? Taking?  
furosemide (LASIX) 20 mg tablet 9/6/2018  Yes Yes Sig: Take 20 mg by mouth daily. metoprolol succinate (TOPROL-XL) 25 mg XL tablet 9/6/2018  Yes Yes Sig: Take 25 mg by mouth daily. phenytoin ER (DILANTIN EXTENDED) 100 mg ER capsule 9/6/2018  Yes Yes Sig: Take 300 mg by mouth nightly. simvastatin (ZOCOR) 20 mg tablet 9/5/2018 at HS  Yes Yes Sig: Take 20 mg by mouth nightly. Facility-Administered Medications: None Thank you, Gabrielle Ortega, [facility]

## 2018-09-07 NOTE — PROGRESS NOTES
Speech path Acknowledge consult to evaluate this pt. Called staff who reported he is going to get his EEG at any minute and we are not on site. We will follow up Monday AM. Thank you.  
Anders Ascencio, SLP

## 2018-09-07 NOTE — PROGRESS NOTES
[facility]: MED RECONCILIATION Comments/Recommendations:  
? Unable to complete medication interview at this time. Spoke with sister, Juan Unger, via telephone per patient request. She is currently away from the medication list. Will try again later this evening. Thank you, Carmel Cowart, PharmD, BCPS 
[phone]
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
## 2018-09-07 NOTE — PROGRESS NOTES
Spiritual Care Partner Volunteer visited patient in PCU on 9/7/18. Documented by: 
Renee Karimi M.Div.  Paging Service [phone]

## 2018-09-07 NOTE — PROGRESS NOTES
Laboratory monitoring for mood stabilizer and antipsychotics: 
 
Recommended baseline monitoring has been completed based on this patient's current medication regimen (pending lipid panel results). The patient is currently taking the following medication(s):  
Current Facility-Administered Medications Medication Dose Route Frequency  divalproex ER (DEPAKOTE ER) 24 hour tablet 1,000 mg  1,000 mg Oral QHS  [START ON 9/8/2018] aspirin (ASPIRIN) tablet 325 mg  325 mg Oral DAILY  [START ON 9/9/8556] folic acid (FOLVITE) tablet 1 mg  1 mg Oral DAILY  sodium chloride (NS) flush 5-10 mL  5-10 mL IntraVENous Q8H  
 enoxaparin (LOVENOX) injection 40 mg  40 mg SubCUTAneous Q24H  
 metoprolol tartrate (LOPRESSOR) tablet 25 mg  25 mg Oral BID  atorvastatin (LIPITOR) tablet 40 mg  40 mg Oral DAILY Height, Weight, BMI Estimation Estimated body mass index is 19.94 kg/(m^2) as calculated from the following: 
  Height as of this encounter: 177.8 cm (70\"). Weight as of this encounter: 63 kg (139 lb). Renal Function, Hepatic Function and Chemistry Estimated Creatinine Clearance: 59.8 mL/min (based on Cr of 1.1). Lab Results Component Value Date/Time Sodium 139 09/06/2018 07:11 PM  
 Potassium 3.8 09/06/2018 07:11 PM  
 Chloride 105 09/06/2018 07:11 PM  
 CO2 28 09/06/2018 07:11 PM  
 Anion gap 6 09/06/2018 07:11 PM  
 Glucose 93 09/06/2018 07:11 PM  
 BUN 32 (H) 09/06/2018 07:11 PM  
 Creatinine 1.10 09/06/2018 07:11 PM  
 BUN/Creatinine ratio 29 (H) 09/06/2018 07:11 PM  
 GFR est AA >60 09/06/2018 07:11 PM  
 GFR est non-AA >60 09/06/2018 07:11 PM  
 Calcium 8.7 09/06/2018 07:11 PM  
 ALT (SGPT) 69 09/06/2018 07:11 PM  
 AST (SGOT) 67 (H) 09/06/2018 07:11 PM  
 Alk.  phosphatase 110 09/06/2018 07:11 PM  
 Protein, total 7.8 09/06/2018 07:11 PM  
 Albumin 3.4 (L) 09/06/2018 07:11 PM  
 Globulin 4.4 (H) 09/06/2018 07:11 PM  
 A-G Ratio 0.8 (L) 09/06/2018 07:11 PM  
 Bilirubin, total 0.2 09/06/2018 07:11 PM  
 
 
Lab Results Component Value Date/Time Glucose 93 09/06/2018 07:11 PM  
 Glucose (POC) 103 (H) 09/06/2018 07:06 PM  
 
Hematology Lab Results Component Value Date/Time WBC 3.9 (L) 09/06/2018 07:11 PM  
 HGB 11.2 (L) 09/06/2018 07:11 PM  
 HCT 35.1 (L) 09/06/2018 07:11 PM  
 PLATELET 289 85/15/8262 07:11 PM  
 MCV 92.1 09/06/2018 07:11 PM  
 
 
Lipids Ordered for 9/7/18. Thyroid Function Lab Results Component Value Date/Time TSH 1.81 09/06/2018 07:11 PM  
 
Vitals Visit Vitals  /64 (BP 1 Location: Left arm, BP Patient Position: Sitting)  Pulse (!) 59  Temp 97.9 °F (36.6 °C)  Resp 18  Ht 177.8 cm (70\")  Wt 63 kg (139 lb)  SpO2 100%  BMI 19.94 kg/m2 Thank you, Vanessa Castaneda, PharmD, BCPS 
[phone]

## 2018-09-07 NOTE — PROGRESS NOTES
TRANSFER - IN REPORT: 
 
Verbal report received from Harmony(name) on Mika Barnes  being received from ED(unit) for routine progression of care Report consisted of patients Situation, Background, Assessment and  
Recommendations(SBAR). Information from the following report(s) SBAR, Kardex, ED Summary, Intake/Output, MAR and Recent Results was reviewed with the receiving nurse. Opportunity for questions and clarification was provided. Assessment completed upon patients arrival to unit and care assumed. 2305: Admission: Pt arrived on floor via stretcher from the ED, no family at bedside. Pt placed in PCU bed near nurses station, vitals stable and pt is alert and oriented x3. Pt denies having any valuables at bedside and all pt belonging placed in top drawer. No valuables sent to safe. Pt denies any pain. Initiated seizure precautions. Will cont to monitor.

## 2018-09-07 NOTE — ED NOTES
TRANSFER - OUT REPORT: 
 
Verbal report given to Collin Rodriguez RN (name) on Anneliese Araujo  being transferred to PCU (unit) for routine progression of care Report consisted of patients Situation, Background, Assessment and  
Recommendations(SBAR). Information from the following report(s) SBAR, Kardex and ED Summary was reviewed with the receiving nurse. Lines:  
Peripheral IV 09/06/18 Left Antecubital (Active) Site Assessment Clean, dry, & intact 9/6/2018  7:31 PM  
Phlebitis Assessment 0 9/6/2018  7:31 PM  
Infiltration Assessment 0 9/6/2018  7:31 PM  
Dressing Status Clean, dry, & intact 9/6/2018  7:31 PM  
Hub Color/Line Status Pink 9/6/2018  7:31 PM  
Action Taken Catheter retaped 9/6/2018  7:31 PM  
  
 
Opportunity for questions and clarification was provided. Patient transported with: 
 Registered Nurse, cardiac monitoring, seizure pads, pt belongings.
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
## 2018-09-07 NOTE — PROGRESS NOTES
Bedside and Verbal shift change report given to Jair Fish (oncoming nurse) by Jennifer Villagran (offgoing nurse). Report included the following information SBAR, Kardex, ED Summary, MAR, Accordion, Recent Results and Cardiac Rhythm nsr. Patient in no acute distress. Mentions that vision has progressively been worsening over the years; no acute complaints. 4:27 PM 
Speech therapist Liz Patel) called re patient. Discussed patient case with her. Confirmed that the patient is to get an EEG done at some point this evening. I could not provide an exact time for the test as requested. Called nursing supervisor to see if she can find out when the EEG tech is planning to come. Will give Gita Solano a call back at [phone] if I find out anything definite. Gita Solano will plan to head over to [facility] now. 
 
4:47 PM per nursing supervisor, EEG tech is on the way to see this patient. Called Gita Solano (speech therapy) and gave her this information. 1915 Bedside and Verbal shift change report given to René Choudhary (oncoming nurse) by Jair Fish (offgoing nurse). Report included the following information SBAR, Kardex, ED Summary, Accordion, Recent Results and Med Rec Status.

## 2018-09-07 NOTE — H&P
Hospitalist Admission NoteNAME: Elisabeth Hernandez :  1955 MRN:  [id number] Room Number: ZPN5/05  @ [facility]  
 
Date/Time:  2018 8:56 AM 
 
Patient PCP: None 
______________________________________________________________________ Given the patient's current clinical presentation, I have a high level of concern for decompensation if discharged from the emergency department. Complex decision making was performed, which includes reviewing the patient's available past medical records, laboratory results, and x-ray films. My assessment of this patient's clinical condition and my plan of care is as follows. Assessment / Plan: 
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 No acute intracranial process is identified. Patient has subtherapeutic Dilantin level at 2.8. Per neurology recommendation  Discontinue dilantin and start Depakote . Appreciate neurology recommendations EEG Seizure precaution History of brain trauma in early 25s Prior left frontal craniotomy Due to the fact patient has metal plate in his head, MRI is contraindicated Hypertension Resume Lopressor Hyperlipidemia On statin Body mass index is 19.94 kg/(m^2). Code Status: full Surrogate Decision Maker:sisters DVT Prophylaxis: Lovenox GI Prophylaxis: not indicated Baseline: lives with sisters(2)luis and meliton. Recently released from longterm Subjective: CHIEF COMPLAINT: AMS HISTORY OF PRESENT ILLNESS:   PATIENT ADMITTED OVERNIGHT BY THE TELEHOSPITALIST Elisabeth Hernandez is a 70 y.o.  male with PMH of Phylliss Collie trauma,HTN who presents to ED with c/o above. Patient presented with family member with complaints of a new onset altered mental status and possible seizure activity. Please note history was taken by the patient, ED documentation and patient sister via phone. Patient reports he has a long history of seizure and was recently released from [facility] on 7/9/18 after 22 years at [facility]. He was on phenobarbital and Dilantin but on release, he was only given Dilantin. His last seizure was 14 years ago. Patient reports he knows when he is going to have a seizure, as we will get confused, has head-bobbing and blacks out. He denies any bowel or bladder incontinence. Has occasional tongue bite. Does not recall any of the events post seizure episode. Per sister, patient was in his usual health the day prior to presentation after which she noticed patient complaining of headaches and more confused, not being at his baseline. He also reports having minimal left-sided residual weakness and  brain trauma in his early 25s and has a metal plate. In ED, patient's Dilantin level was subtherapeutic at 2.8. Head CT showed chronic encephalomalacia in the anterior left frontal lobe. No acute intracranial process was identified. Currently on my examination patient is alert ,however oriented only to place and person. Neurology already evaluated the patient and discontinued his Dilantin and started on Depakote. We were asked to admit for work up and evaluation of the above problems. Past Medical History:  
Diagnosis Date  High cholesterol  HTN (hypertension)  Seizure ([address] History reviewed. No pertinent surgical history. Social History Substance Use Topics  Smoking status: Unknown If Ever Smoked  Smokeless tobacco: Not on file  Alcohol use Not on file History reviewed. No pertinent family history. No Known Allergies Prior to Admission medications Medication Sig Start Date End Date Taking? Authorizing Provider  
simvastatin (ZOCOR) 20 mg tablet Take 20 mg by mouth nightly.    Yes Phys Other, MD  
 furosemide (LASIX) 20 mg tablet Take 20 mg by mouth daily. Yes Nitish Estes MD  
metoprolol tartrate (LOPRESSOR) 25 mg tablet Take  by mouth two (2) times a day. Yes Nitish Estes MD  
phenytoin ER (DILANTIN EXTENDED) 100 mg ER capsule Take 100 mg by mouth. Yes Nitish Estes MD  
 
 
REVIEW OF SYSTEMS:    
I am not able to complete the review of systems because: The patient is intubated and sedated The patient has altered mental status due to his acute medical problems The patient has baseline aphasia from prior stroke(s) The patient has baseline dementia and is not reliable historian The patient is in acute medical distress and unable to provide information Total of 12 systems reviewed as follows:   
   POSITIVE= underlined text  Negative = text not underlined General:  fever, chills, sweats, generalized weakness, weight loss/gain,  
   loss of appetite Eyes:    blurred vision, eye pain, loss of vision, double vision ENT:    rhinorrhea, pharyngitis Respiratory:   cough, sputum production, SOB, LINDSEY, wheezing, pleuritic pain  
Cardiology:   chest pain, palpitations, orthopnea, PND, edema, syncope Gastrointestinal:  abdominal pain , N/V, diarrhea, dysphagia, constipation, bleeding Genitourinary:  frequency, urgency, dysuria, hematuria, incontinence Muskuloskeletal :  arthralgia, myalgia, back pain Hematology:  easy bruising, nose or gum bleeding, lymphadenopathy Dermatological: rash, ulceration, pruritis, color change / jaundice Endocrine:   hot flashes or polydipsia Neurological:  headache, dizziness, confusion, focal weakness, paresthesia, Speech difficulties, memory loss, gait difficulty Psychological: Feelings of anxiety, depression, agitation Objective: VITALS:   
Visit Vitals  /64 (BP 1 Location: Left arm, BP Patient Position: Sitting)  Pulse (!) 59  Temp 97.9 °F (36.6 °C)  Resp 18  Ht 5' 10\" (1.778 m)  Wt 63 kg (139 lb)  SpO2 100%  BMI 19.94 kg/m2 PHYSICAL EXAM: 
 
General:    Alert, cooperative, no distress, appears stated age. HEENT: Atraumatic, anicteric sclerae, pink conjunctivae No oral ulcers, mucosa moist, throat clear, dentition fair Neck:  Supple, symmetrical,  thyroid: non tender Lungs:   Clear to auscultation bilaterally. No Wheezing or Rhonchi. No rales. Chest wall:  No tenderness  No Accessory muscle use. Heart:   Regular  rhythm,  No  murmur   No edema Abdomen:   Soft, non-tender. Not distended. Bowel sounds normal 
Extremities: No cyanosis. No clubbing,   
  Skin turgor normal, Capillary refill normal, Radial dial pulse 2+ Skin:     Not pale. Not Jaundiced  No rashes Psych:  Good insight. Not depressed. Not anxious or agitated. Neurologic: EOMs intact. No facial asymmetry. No aphasia or slurred speech. motor strength 4/5 left side UE,LE, Sensation grossly intact. Alert and oriented X 4.  
 
______________________________________________________________________ Care Plan discussed with:  Patient/Family, Nurse and  Expected  Disposition:  Home w/Family 
________________________________________________________________________ TOTAL TIME:  80 Minutes Critical Care Provided     Minutes non procedure based Comments Reviewed previous records  
>50% of visit spent in counseling and coordination of care  Discussion with patient and/or family and questions answered 
  
 
________________________________________________________________________ Signed: Madison Arambula MD 
 
Procedures: see electronic medical records for all procedures/Xrays and details which were not copied into this note but were reviewed prior to creation of Plan. LAB DATA REVIEWED:   
Recent Results (from the past 24 hour(s)) GLUCOSE, POC Collection Time: 09/06/18  7:06 PM  
Result Value Ref Range Glucose (POC) 103 (H) 65 - 100 mg/dL Performed by Maria C Lemus SAMPLES BEING HELD  
 Collection Time: 09/06/18  7:11 PM  
Result Value Ref Range SAMPLES BEING HELD 1sst,1BLU,1LAV,1DKGRN,1PST,1PINK COMMENT Add-on orders for these samples will be processed based on acceptable specimen integrity and analyte stability, which may vary by analyte. TYPE & SCREEN Collection Time: 09/06/18  7:11 PM  
Result Value Ref Range Crossmatch Expiration 09/09/2018 ABO/Rh(D) B POSITIVE Antibody screen POS Antibody ID ANTI-M   
 ANTIGEN TYPING M NEGATIVE, Unit number [id number] Blood component type RC LR,1 Unit division 00 Status of unit ALLOCATED ANTIGEN/ANTIBODY INFO M NEGATIVE, Crossmatch result Compatible PROTHROMBIN TIME + INR Collection Time: 09/06/18  7:11 PM  
Result Value Ref Range INR 1.0 0.9 - 1.1 Prothrombin time 9.8 9.0 - 11.1 sec CBC WITH AUTOMATED DIFF Collection Time: 09/06/18  7:11 PM  
Result Value Ref Range WBC 3.9 (L) 4.1 - 11.1 K/uL  
 RBC 3.81 (L) 4.10 - 5.70 M/uL  
 HGB 11.2 (L) 12.1 - 17.0 g/dL HCT 35.1 (L) 36.6 - 50.3 % MCV 92.1 80.0 - 99.0 FL  
 MCH 29.4 26.0 - 34.0 PG  
 MCHC 31.9 30.0 - 36.5 g/dL  
 RDW 13.5 11.5 - 14.5 % PLATELET 101 339 - 854 K/uL MPV 10.4 8.9 - 12.9 FL  
 NRBC 0.0 0  WBC ABSOLUTE NRBC 0.00 0.00 - 0.01 K/uL NEUTROPHILS 53 32 - 75 % LYMPHOCYTES 22 12 - 49 % MONOCYTES 21 (H) 5 - 13 % EOSINOPHILS 4 0 - 7 % BASOPHILS 0 0 - 1 % IMMATURE GRANULOCYTES 0 0.0 - 0.5 % ABS. NEUTROPHILS 2.0 1.8 - 8.0 K/UL  
 ABS. LYMPHOCYTES 0.9 0.8 - 3.5 K/UL  
 ABS. MONOCYTES 0.8 0.0 - 1.0 K/UL  
 ABS. EOSINOPHILS 0.2 0.0 - 0.4 K/UL  
 ABS. BASOPHILS 0.0 0.0 - 0.1 K/UL  
 ABS. IMM. GRANS. 0.0 0.00 - 0.04 K/UL  
 DF SMEAR SCANNED    
 RBC COMMENTS NORMOCYTIC, NORMOCHROMIC METABOLIC PANEL, COMPREHENSIVE Collection Time: 09/06/18  7:11 PM  
Result Value Ref Range Sodium 139 136 - 145 mmol/L Potassium 3.8 3.5 - 5.1 mmol/L  Chloride 105 97 - 108 mmol/L  
 CO2 28 21 - 32 mmol/L Anion gap 6 5 - 15 mmol/L Glucose 93 65 - 100 mg/dL BUN 32 (H) 6 - 20 MG/DL Creatinine 1.10 0.70 - 1.30 MG/DL  
 BUN/Creatinine ratio 29 (H) 12 - 20 GFR est AA >60 >60 ml/min/1.73m2 GFR est non-AA >60 >60 ml/min/1.73m2 Calcium 8.7 8.5 - 10.1 MG/DL Bilirubin, total 0.2 0.2 - 1.0 MG/DL  
 ALT (SGPT) 69 12 - 78 U/L  
 AST (SGOT) 67 (H) 15 - 37 U/L Alk. phosphatase 110 45 - 117 U/L Protein, total 7.8 6.4 - 8.2 g/dL Albumin 3.4 (L) 3.5 - 5.0 g/dL Globulin 4.4 (H) 2.0 - 4.0 g/dL A-G Ratio 0.8 (L) 1.1 - 2.2 MAGNESIUM Collection Time: 09/06/18  7:11 PM  
Result Value Ref Range Magnesium 2.1 1.6 - 2.4 mg/dL TROPONIN I Collection Time: 09/06/18  7:11 PM  
Result Value Ref Range Troponin-I, Qt. <0.05 <0.05 ng/mL TSH 3RD GENERATION Collection Time: 09/06/18  7:11 PM  
Result Value Ref Range TSH 1.81 0.36 - 3.74 uIU/mL DRUG SCREEN, URINE Collection Time: 09/06/18  7:32 PM  
Result Value Ref Range AMPHETAMINES NEGATIVE  NEG    
 BARBITURATES POSITIVE (A) NEG BENZODIAZEPINES NEGATIVE  NEG    
 COCAINE NEGATIVE  NEG METHADONE NEGATIVE  NEG    
 OPIATES NEGATIVE  NEG    
 PCP(PHENCYCLIDINE) NEGATIVE  NEG    
 THC (TH-CANNABINOL) NEGATIVE  NEG Drug screen comment (NOTE) URINALYSIS W/ RFLX MICROSCOPIC Collection Time: 09/06/18  7:32 PM  
Result Value Ref Range Color STRAW Appearance CLEAR CLEAR Specific gravity 1.010 1.003 - 1.030    
 pH (UA) 5.0 5.0 - 8.0 Protein NEGATIVE  NEG mg/dL Glucose NEGATIVE  NEG mg/dL Ketone NEGATIVE  NEG mg/dL Bilirubin NEGATIVE  NEG Blood NEGATIVE  NEG Urobilinogen 0.2 0.2 - 1.0 EU/dL Nitrites NEGATIVE  NEG Leukocyte Esterase NEGATIVE  NEG    
EKG, 12 LEAD, INITIAL Collection Time: 09/06/18  7:39 PM  
Result Value Ref Range Ventricular Rate 60 BPM  
 Atrial Rate 60 BPM  
 P-R Interval 136 ms QRS Duration 78 ms Q-T Interval 434 ms QTC Calculation (Bezet) 434 ms Calculated P Axis 44 degrees Calculated R Axis -11 degrees Calculated T Axis 15 degrees Diagnosis Normal sinus rhythm with sinus arrhythmia Left axis deviation 
minimal changes of early repolarization in the precordium Confirmed by Jabari Gonzalez MD, Mary Keen ([id number]) on 9/6/2018 9:16:24 PM 
  
ACETAMINOPHEN Collection Time: 09/06/18  7:42 PM  
Result Value Ref Range Acetaminophen level <2 (L) 10 - 30 ug/mL PHENYTOIN Collection Time: 09/06/18  7:42 PM  
Result Value Ref Range Phenytoin 2.8 (L) 10.0 - 20.0 ug/mL SALICYLATE Collection Time: 09/06/18  7:42 PM  
Result Value Ref Range Salicylate level <9.4 (L) 2.8 - 20.0 MG/DL  
ETHYL ALCOHOL Collection Time: 09/06/18  7:42 PM  
Result Value Ref Range  ALCOHOL(ETHYL),SERUM <10 <10 MG/DL

## 2018-09-07 NOTE — H&P
GENERAL GENERIC H&P/CONSULT Subjective: confused 70year old male brought in by family who have since left for confusion since yesterday. Patient with known seizure disorder and S/P brain trauma and low dilantin level. No witnessed seizure. He has recently been released from custodial after many years and takes medication for HTN and seizures. The patient states he is \"not right\" but cannot delineate further. He denies any pain, recent illness, vomiting or diarrhea and states he has been taking his medications (Dilantin level 2.8). Unfortunately the ED notes are limited, the patient can provide little history and the family went home. Past Medical History:  
Diagnosis Date  High cholesterol  HTN (hypertension)  Seizure ([address] History reviewed. No pertinent surgical history. Prior to Admission medications Medication Sig Start Date End Date Taking? Authorizing Provider  
simvastatin (ZOCOR) 20 mg tablet Take 20 mg by mouth nightly. Yes Nitish Estes MD  
furosemide (LASIX) 20 mg tablet Take 20 mg by mouth daily. Yes Nitish Estes MD  
metoprolol tartrate (LOPRESSOR) 25 mg tablet Take  by mouth two (2) times a day. Yes Nitish Estes MD  
phenytoin ER (DILANTIN EXTENDED) 100 mg ER capsule Take 100 mg by mouth. Yes Nitish Estes MD  
 
No Known Allergies Social History Substance Use Topics  Smoking status: Unknown If Ever Smoked  Smokeless tobacco: Not on file  Alcohol use Not on file History reviewed. No pertinent family history. Review of Systems Constitutional: Negative for activity change, chills and fever. HENT: Negative. Eyes: Negative. Respiratory: Negative for cough, choking and shortness of breath. Cardiovascular: Negative for chest pain and leg swelling. Gastrointestinal: Negative. Endocrine: Negative. Genitourinary: Negative. Musculoskeletal: Negative. Allergic/Immunologic: Negative. Neurological: Positive for seizures. Hematological: Negative. Objective: 
 
  
  
Patient Vitals for the past 8 hrs: 
 BP Temp Pulse Resp SpO2 Height Weight 09/06/18 2322 109/72 98.6 °F (37 °C) 64 18 96 % - -  
09/06/18 2223 - 98.8 °F (37.1 °C) - - - - -  
09/06/18 2200 107/73 - 60 14 98 % - -  
09/06/18 2100 120/69 - (!) 59 18 99 % - -  
09/06/18 2030 120/65 - (!) 58 22 100 % - -  
09/06/18 1945 114/73 - - - - - -  
09/06/18 1907 125/80 - - - - - -  
09/06/18 1904 125/80 98.6 °F (37 °C) (!) 58 18 99 % 5' 10\" (1.778 m) 63 kg (139 lb) Physical Exam  
Nursing note and vitals reviewed. Cardiovascular: Normal rate, regular rhythm, normal heart sounds and intact distal pulses. Musculoskeletal: Normal range of motion. He exhibits no edema. Neurological: He is alert. No cranial nerve deficit or sensory deficit. He exhibits abnormal muscle tone. GCS eye subscore is 4. GCS verbal subscore is 5. GCS motor subscore is 6. Not oriented. States name and then cannot state month. Carline Nevarez is president 
 
seems to have left leg weakness but does equal upper extremities,  No obvious sensory issues, equal smile, clear speech, equal eyebrow raise Labs:   
Recent Results (from the past 24 hour(s)) GLUCOSE, POC Collection Time: 09/06/18  7:06 PM  
Result Value Ref Range Glucose (POC) 103 (H) 65 - 100 mg/dL Performed by Rose Marie Mariscal SAMPLES BEING HELD Collection Time: 09/06/18  7:11 PM  
Result Value Ref Range SAMPLES BEING HELD 1sst,1BLU,1LAV,1DKGRN,1PST,1PINK COMMENT Add-on orders for these samples will be processed based on acceptable specimen integrity and analyte stability, which may vary by analyte. TYPE & SCREEN Collection Time: 09/06/18  7:11 PM  
Result Value Ref Range Crossmatch Expiration 09/09/2018 ABO/Rh(D) B POSITIVE Antibody screen POS PROTHROMBIN TIME + INR Collection Time: 09/06/18  7:11 PM  
Result Value Ref Range INR 1.0 0.9 - 1.1 Prothrombin time 9.8 9.0 - 11.1 sec CBC WITH AUTOMATED DIFF Collection Time: 09/06/18  7:11 PM  
Result Value Ref Range WBC 3.9 (L) 4.1 - 11.1 K/uL  
 RBC 3.81 (L) 4.10 - 5.70 M/uL  
 HGB 11.2 (L) 12.1 - 17.0 g/dL HCT 35.1 (L) 36.6 - 50.3 % MCV 92.1 80.0 - 99.0 FL  
 MCH 29.4 26.0 - 34.0 PG  
 MCHC 31.9 30.0 - 36.5 g/dL  
 RDW 13.5 11.5 - 14.5 % PLATELET 323 466 - 124 K/uL MPV 10.4 8.9 - 12.9 FL  
 NRBC 0.0 0  WBC ABSOLUTE NRBC 0.00 0.00 - 0.01 K/uL NEUTROPHILS 53 32 - 75 % LYMPHOCYTES 22 12 - 49 % MONOCYTES 21 (H) 5 - 13 % EOSINOPHILS 4 0 - 7 % BASOPHILS 0 0 - 1 % IMMATURE GRANULOCYTES 0 0.0 - 0.5 % ABS. NEUTROPHILS 2.0 1.8 - 8.0 K/UL  
 ABS. LYMPHOCYTES 0.9 0.8 - 3.5 K/UL  
 ABS. MONOCYTES 0.8 0.0 - 1.0 K/UL  
 ABS. EOSINOPHILS 0.2 0.0 - 0.4 K/UL  
 ABS. BASOPHILS 0.0 0.0 - 0.1 K/UL  
 ABS. IMM. GRANS. 0.0 0.00 - 0.04 K/UL  
 DF SMEAR SCANNED    
 RBC COMMENTS NORMOCYTIC, NORMOCHROMIC METABOLIC PANEL, COMPREHENSIVE Collection Time: 09/06/18  7:11 PM  
Result Value Ref Range Sodium 139 136 - 145 mmol/L Potassium 3.8 3.5 - 5.1 mmol/L Chloride 105 97 - 108 mmol/L  
 CO2 28 21 - 32 mmol/L Anion gap 6 5 - 15 mmol/L Glucose 93 65 - 100 mg/dL BUN 32 (H) 6 - 20 MG/DL Creatinine 1.10 0.70 - 1.30 MG/DL  
 BUN/Creatinine ratio 29 (H) 12 - 20 GFR est AA >60 >60 ml/min/1.73m2 GFR est non-AA >60 >60 ml/min/1.73m2 Calcium 8.7 8.5 - 10.1 MG/DL Bilirubin, total 0.2 0.2 - 1.0 MG/DL  
 ALT (SGPT) 69 12 - 78 U/L  
 AST (SGOT) 67 (H) 15 - 37 U/L Alk. phosphatase 110 45 - 117 U/L Protein, total 7.8 6.4 - 8.2 g/dL Albumin 3.4 (L) 3.5 - 5.0 g/dL Globulin 4.4 (H) 2.0 - 4.0 g/dL A-G Ratio 0.8 (L) 1.1 - 2.2 MAGNESIUM Collection Time: 09/06/18  7:11 PM  
Result Value Ref Range Magnesium 2.1 1.6 - 2.4 mg/dL TROPONIN I Collection Time: 09/06/18  7:11 PM  
Result Value Ref Range Troponin-I, Qt. <0.05 <0.05 ng/mL TSH 3RD GENERATION Collection Time: 09/06/18  7:11 PM  
Result Value Ref Range TSH 1.81 0.36 - 3.74 uIU/mL DRUG SCREEN, URINE Collection Time: 09/06/18  7:32 PM  
Result Value Ref Range AMPHETAMINES NEGATIVE  NEG    
 BARBITURATES POSITIVE (A) NEG BENZODIAZEPINES NEGATIVE  NEG    
 COCAINE NEGATIVE  NEG METHADONE NEGATIVE  NEG    
 OPIATES NEGATIVE  NEG    
 PCP(PHENCYCLIDINE) NEGATIVE  NEG    
 THC (TH-CANNABINOL) NEGATIVE  NEG Drug screen comment (NOTE) URINALYSIS W/ RFLX MICROSCOPIC Collection Time: 09/06/18  7:32 PM  
Result Value Ref Range Color STRAW Appearance CLEAR CLEAR Specific gravity 1.010 1.003 - 1.030    
 pH (UA) 5.0 5.0 - 8.0 Protein NEGATIVE  NEG mg/dL Glucose NEGATIVE  NEG mg/dL Ketone NEGATIVE  NEG mg/dL Bilirubin NEGATIVE  NEG Blood NEGATIVE  NEG Urobilinogen 0.2 0.2 - 1.0 EU/dL Nitrites NEGATIVE  NEG Leukocyte Esterase NEGATIVE  NEG    
EKG, 12 LEAD, INITIAL Collection Time: 09/06/18  7:39 PM  
Result Value Ref Range Ventricular Rate 60 BPM  
 Atrial Rate 60 BPM  
 P-R Interval 136 ms QRS Duration 78 ms Q-T Interval 434 ms QTC Calculation (Bezet) 434 ms Calculated P Axis 44 degrees Calculated R Axis -11 degrees Calculated T Axis 15 degrees Diagnosis Normal sinus rhythm with sinus arrhythmia Left axis deviation 
minimal changes of early repolarization in the precordium Confirmed by Marisel Murillo MD, [facility] ([id number]) on 9/6/2018 9:16:24 PM 
  
ACETAMINOPHEN Collection Time: 09/06/18  7:42 PM  
Result Value Ref Range Acetaminophen level <2 (L) 10 - 30 ug/mL PHENYTOIN Collection Time: 09/06/18  7:42 PM  
Result Value Ref Range Phenytoin 2.8 (L) 10.0 - 20.0 ug/mL SALICYLATE Collection Time: 09/06/18  7:42 PM  
Result Value Ref Range Salicylate level <3.3 (L) 2.8 - 20.0 MG/DL  
ETHYL ALCOHOL Collection Time: 09/06/18  7:42 PM  
Result Value Ref Range ALCOHOL(ETHYL),SERUM <10 <10 MG/DL EXAM:  CT HEAD WO CONT Clinical history: Decreased alertness INDICATION:   Decreased alertness; anisocoria 
  
COMPARISON: None. 
  
CONTRAST:  None. 
  
TECHNIQUE: Unenhanced CT of the head was performed using 5 mm images. Brain and 
bone windows were generated. CT dose reduction was achieved through use of a 
standardized protocol tailored for this examination and automatic exposure 
control for dose modulation.   
  
FINDINGS: 
Encephalomalacia in the left frontal lobe is related to remote infarction/trauma Susie Tanvir Prior left frontal craniotomy. . There is no intracranial hemorrhage, 
extra-axial collection, mass, mass effect or midline shift. The basilar 
cisterns are open. No acute infarct is identified. The bone windows demonstrate 
no abnormalities. The visualized portions of the paranasal sinuses and mastoid 
air cells are clear. 
  
IMPRESSION IMPRESSION:  
  
Chronic encephalomalacia in the anterior left frontal lobe. 
  
No acute intracranial process is identified. 
  
 
Assessment: 
Principal Problem: 
  Altered mental status (9/6/2018) Active Problems: Hypertension (9/6/2018) Seizure ([address] (9/6/2018) Plan: Monitoring. Reloading Dilantin at this time. Reassess mental status in am and repeat exam.  Not sure if he is having a prolonged postictal response to an unwitnessed seizure, CVA or some sort of intoxication. The patient denies drugs, tobacco or alcohol Signed: 
Arianna Nicolas MD 9/6/2018

## 2018-09-08 VITALS
OXYGEN SATURATION: 100 % | RESPIRATION RATE: 16 BRPM | HEIGHT: 70 IN | HEART RATE: 56 BPM | WEIGHT: 139 LBS | TEMPERATURE: 98.1 F | DIASTOLIC BLOOD PRESSURE: 64 MMHG | BODY MASS INDEX: 19.9 KG/M2 | SYSTOLIC BLOOD PRESSURE: 111 MMHG

## 2018-09-08 LAB
ANION GAP SERPL CALC-SCNC: 6 MMOL/L (ref 5–15)
BUN SERPL-MCNC: 18 MG/DL (ref 6–20)
BUN/CREAT SERPL: 19 (ref 12–20)
CALCIUM SERPL-MCNC: 8.4 MG/DL (ref 8.5–10.1)
CEA SERPL-MCNC: 5.6 NG/ML
CHLORIDE SERPL-SCNC: 105 MMOL/L (ref 97–108)
CO2 SERPL-SCNC: 27 MMOL/L (ref 21–32)
CREAT SERPL-MCNC: 0.93 MG/DL (ref 0.7–1.3)
GLUCOSE SERPL-MCNC: 81 MG/DL (ref 65–100)
POTASSIUM SERPL-SCNC: 4.1 MMOL/L (ref 3.5–5.1)
SODIUM SERPL-SCNC: 138 MMOL/L (ref 136–145)

## 2018-09-08 PROCEDURE — 36415 COLL VENOUS BLD VENIPUNCTURE: CPT | Performed by: INTERNAL MEDICINE

## 2018-09-08 PROCEDURE — 74011250637 HC RX REV CODE- 250/637: Performed by: PSYCHIATRY & NEUROLOGY

## 2018-09-08 PROCEDURE — 74011250637 HC RX REV CODE- 250/637: Performed by: EMERGENCY MEDICINE

## 2018-09-08 PROCEDURE — 74011250637 HC RX REV CODE- 250/637: Performed by: INTERNAL MEDICINE

## 2018-09-08 PROCEDURE — 82378 CARCINOEMBRYONIC ANTIGEN: CPT | Performed by: PSYCHIATRY & NEUROLOGY

## 2018-09-08 PROCEDURE — 80048 BASIC METABOLIC PNL TOTAL CA: CPT | Performed by: INTERNAL MEDICINE

## 2018-09-08 RX ORDER — METOPROLOL SUCCINATE 25 MG/1
25 TABLET, EXTENDED RELEASE ORAL DAILY
Qty: 30 TAB | Refills: 0 | Status: SHIPPED | OUTPATIENT
Start: 2018-09-09 | End: 2018-10-09

## 2018-09-08 RX ORDER — DIVALPROEX SODIUM 500 MG/1
1000 TABLET, EXTENDED RELEASE ORAL
Qty: 60 TAB | Refills: 0 | Status: SHIPPED | OUTPATIENT
Start: 2018-09-08 | End: 2018-09-19 | Stop reason: SDUPTHER

## 2018-09-08 RX ORDER — ASPIRIN 325 MG
325 TABLET ORAL DAILY
Qty: 30 TAB | Refills: 0 | Status: SHIPPED | OUTPATIENT
Start: 2018-09-09 | End: 2018-10-09

## 2018-09-08 RX ORDER — ATORVASTATIN CALCIUM 40 MG/1
40 TABLET, FILM COATED ORAL DAILY
Qty: 30 TAB | Refills: 0 | Status: SHIPPED | OUTPATIENT
Start: 2018-09-09 | End: 2018-10-09

## 2018-09-08 RX ADMIN — MELATONIN TAB 3 MG 3 MG: 3 TAB at 00:38

## 2018-09-08 RX ADMIN — FOLIC ACID 1 MG: 1 TABLET ORAL at 09:45

## 2018-09-08 RX ADMIN — ASPIRIN 325 MG ORAL TABLET 325 MG: 325 PILL ORAL at 09:45

## 2018-09-08 RX ADMIN — METOPROLOL SUCCINATE 25 MG: 25 TABLET, EXTENDED RELEASE ORAL at 09:00

## 2018-09-08 RX ADMIN — ATORVASTATIN CALCIUM 40 MG: 40 TABLET, FILM COATED ORAL at 09:45

## 2018-09-08 RX ADMIN — Medication 10 ML: at 04:44

## 2018-09-08 NOTE — PROGRESS NOTES
1933: Bedside shift change report given to Bella Gonzalez (oncoming nurse) by Godwin Fu (offgoing nurse). Report included the following information SBAR, Kardex, ED Summary, Intake/Output, MAR and Recent Results. 2259: Notified Dr. Franco Liang re: pt request sleep aid. New order given: Melatonin 3mg PO QHS prn.  
6240: Bedside shift change report given to Godwin Fu (oncoming nurse) by Bella Gonzalez (offgoing nurse). Report included the following information SBAR, Kardex, ED Summary, Intake/Output, MAR and Recent Results.

## 2018-09-08 NOTE — PROGRESS NOTES
Bedside and Verbal shift change report given to Nalini Macias (oncoming nurse) by Karen (offgoing nurse). Report included the following information SBAR, Kardex, MAR, Accordion and Recent Results.
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
## 2018-09-08 NOTE — PROGRESS NOTES
Problem: Falls - Risk of 
Goal: *Absence of Falls Document Edmund Gallo Fall Risk and appropriate interventions in the flowsheet. Outcome: Progressing Towards Goal 
Fall Risk Interventions: 
Mobility Interventions: Communicate number of staff needed for ambulation/transfer, Patient to call before getting OOB, PT Consult for mobility concerns Mentation Interventions: Adequate sleep, hydration, pain control, Door open when patient unattended, Bed/chair exit alarm

## 2018-09-08 NOTE — PROGRESS NOTES
Bedside and Verbal shift change report given to Joselito Gonzales (oncoming nurse) by Karen (offgoing nurse). Report included the following information SBAR, Kardex, MAR, Accordion and Recent Results.

## 2018-09-10 ENCOUNTER — TELEPHONE (OUTPATIENT)
Dept: CASE MANAGEMENT | Age: 63
End: 2018-09-10

## 2018-09-10 LAB
ABO + RH BLD: NORMAL
ACE SERPL-CCNC: 73 U/L (ref 14–82)
ANTIGENS PRESENT BLD: NORMAL
ANTIGENS PRESENT RBC DONR: NORMAL
BLD PROD TYP BPU: NORMAL
BLOOD GROUP ANTIBODIES SERPL: NORMAL
BLOOD GROUP ANTIBODIES SERPL: NORMAL
BPU ID: NORMAL
CROSSMATCH RESULT,%XM: NORMAL
SPECIMEN EXP DATE BLD: NORMAL
STATUS OF UNIT,%ST: NORMAL
UNIT DIVISION, %UDIV: 0

## 2018-09-10 NOTE — TELEPHONE ENCOUNTER
Patient d/c over weekend. CM schedule the following appointment for patient. CM call patient sister Edgar Goodpasture [phone]. Verify patient full-name and . CM inform of patient scheduled appointment. CM inform a mailed copy to patient home. Patient staying with sister Edgar Goodpasture. Patient and sister has no questions post-discharge. Anne Phelps MD On 9/10/2018 Your appointment time is 11:15 am. [address] [place] [address]   [phone]      Pinky Trujillo MD On 2018 Your appointment time is 8:30am [address] [place] [address] Dentist On 10/17/2018 Your appointment time is 1:00pm. Please arrive at 12:00 to fill out paperwork. Dr. Kael Leija dentist   [address]   [id number].

## 2018-09-11 LAB — 1,25(OH)2D3 SERPL-MCNC: 48.6 PG/ML (ref 19.9–79.3)

## 2018-09-19 ENCOUNTER — OFFICE VISIT (OUTPATIENT)
Dept: NEUROLOGY | Age: 63
End: 2018-09-19

## 2018-09-19 VITALS
DIASTOLIC BLOOD PRESSURE: 73 MMHG | HEART RATE: 60 BPM | RESPIRATION RATE: 16 BRPM | HEIGHT: 70 IN | SYSTOLIC BLOOD PRESSURE: 102 MMHG | BODY MASS INDEX: 20.7 KG/M2 | WEIGHT: 144.6 LBS | OXYGEN SATURATION: 99 % | TEMPERATURE: 98.3 F

## 2018-09-19 DIAGNOSIS — R42 DIZZINESS: ICD-10-CM

## 2018-09-19 DIAGNOSIS — F07.81 POST-TRAUMATIC BRAIN SYNDROME: ICD-10-CM

## 2018-09-19 DIAGNOSIS — G40.209 PARTIAL SYMPTOMATIC EPILEPSY WITH COMPLEX PARTIAL SEIZURES, NOT INTRACTABLE, WITHOUT STATUS EPILEPTICUS (HCC): Primary | ICD-10-CM

## 2018-09-19 DIAGNOSIS — R29.6 FREQUENT FALLS: ICD-10-CM

## 2018-09-19 RX ORDER — HYDROXYZINE 50 MG/1
50 TABLET, FILM COATED ORAL
COMMUNITY

## 2018-09-19 RX ORDER — DIVALPROEX SODIUM 500 MG/1
500 TABLET, EXTENDED RELEASE ORAL
Qty: 30 TAB | Refills: 2 | Status: SHIPPED | OUTPATIENT
Start: 2018-09-19 | End: 2018-10-09
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
## 2018-09-19 NOTE — PATIENT INSTRUCTIONS
All test results will be discussed at the next appointment. Electroencephalogram (EEG): About This Test  What is it? An electroencephalogram (EEG) lets a doctor see the electrical activity of your brain. You will have small pads or patches attached to different places on your head. These are called electrodes. Wires connect the electrodes to a computer. The computer records the activity of the brain. This looks like wavy lines on the computer screen or on paper. Why is this test done? The test is often used to diagnose epilepsy. It helps a doctor know what types of seizures are happening. An EEG can also check brain activity in people with sleep disorders. It can also help a doctor know why a person passed out (lost consciousness). How can you prepare for the test?  · Tell your doctor if you are taking any medicines. Your doctor may ask you to stop taking certain medicines before the test. These include sedatives and tranquilizers, muscle relaxants, sleeping aids, and seizure medicines. · Do not eat or drink anything with caffeine in it for 12 hours before the test. This includes cola, energy drinks, and chocolate. · Shampoo your hair and rinse with clear water the evening before or the morning of the test. Do not put any hair conditioner or oil on after you wash your hair. · Your doctor may ask you not to sleep the night before the test or to sleep for only about 4 or 5 hours. This is because some types of brain activity can only be seen if you are asleep. If your doctor asks you to get less sleep than normal, plan to have someone drive you to and from the test.  What happens during the test?  · You will lie on your back on a bed or table. Or you might relax in a chair with your eyes closed. · A technologist will attach the electrodes to different places on your head. Or you might get a cap with fixed electrodes on it. · You will lie still with your eyes closed.  The technologist will tell you not to talk unless you need to. · The technologist may ask you to:  ¨ Breathe deeply and rapidly. This is called hyperventilating. ¨ Look at a bright, flashing light called a strobe. ¨ Go to sleep. If you can't fall asleep, you may get medicine to help you. What else should you know about the test?  · There is no pain. No electrical current goes through your body. · If you have a seizure disorder such as epilepsy, the flashing lights or hyperventilation may cause a seizure. The technologist is trained to take care of you if this happens. · If electrodes are put in your nose, they may tickle. In rare cases, they can also cause some soreness or a small amount of bleeding for 1 to 2 days after the test.  How long does the test take? · The test will take about 1 to 2 hours. What happens after the test?  · You will probably be able to go home right away. But if you didn't sleep your normal amount before the test, have someone drive you home. · You can go back to your usual activities right away. When should you call for help? Watch closely for changes in your health, and be sure to contact your doctor if:  · You have any problems that you think may be from the test.  · You have any questions about the test or have not received your results. Follow-up care is a key part of your treatment and safety. Be sure to make and go to all appointments, and call your doctor if you are having problems. It's also a good idea to keep a list of the medicines you take. Ask your doctor when you can expect to have your test results. Where can you learn more? Go to http://sanjiv-maria m.info/. Enter F196 in the search box to learn more about \"Electroencephalogram (EEG): About This Test.\"  Current as of: October 9, 2017  Content Version: 11.7  © 9820-4953 [employer]. Care instructions adapted under license by [employer] (which disclaims liability or warranty for this information).  If you have questions about a medical condition or this instruction, always ask your healthcare professional. [place] [address] any warranty or liability for your use of this information.

## 2018-09-19 NOTE — MR AVS SNAPSHOT
Chasidy Rawls 64 [address] 
[phone] Patient: Piyush Winchester. MRN: [id number] UGI:5/04/0162 Visit Information Date & Time Provider Department Dept. Phone Encounter #  
 9/19/2018  9:00 AM Cletus Quintin Claude, MD [facility] [phone] [id number] Follow-up Instructions Return in about 4 weeks (around 10/17/2018). Upcoming Health Maintenance Date Due Hepatitis C Screening 1955 DTaP/Tdap/Td series (1 - Tdap) 3/15/1974 FOBT Q 1 YEAR AGE 50-75 3/15/2003 ZOSTER VACCINE AGE 60> 1/15/2013 GLAUCOMA SCREENING Q2Y 3/15/2018 Pneumococcal 65+ Low/Medium Risk (1 of 2 - PCV13) 3/15/2018 Influenza Age 5 to Adult 8/1/2018 Allergies as of 9/19/2018  Review Complete On: 9/19/2018 By: Say Fay MD  
 No Known Allergies Current Immunizations  Never Reviewed No immunizations on file. Not reviewed this visit You Were Diagnosed With   
  
 Codes Comments Partial symptomatic epilepsy with complex partial seizures, not intractable, without status epilepticus ([facility] [address]    -  Primary ICD-10-CM: F38.709 ICD-9-CM: 345.40 Frequent falls     ICD-10-CM: R29.6 ICD-9-CM: V15.88 Dizziness     ICD-10-CM: J85 ICD-9-CM: 780.4 Post-traumatic brain syndrome     ICD-10-CM: F07.81 ICD-9-CM: 310.2 Vitals BP Pulse Temp Resp Height(growth percentile) 102/73 (BP 1 Location: Right arm, BP Patient Position: Sitting) 60 98.3 °F (36.8 °C) (Temporal) 16 5' 10\" (1.778 m) Weight(growth percentile) SpO2 BMI Smoking Status 144 lb 9.6 oz (65.6 kg) 99% 20.75 kg/m2 Never Smoker BMI and BSA Data Body Mass Index Body Surface Area 20.75 kg/m 2 1.8 m 2 Preferred Pharmacy Pharmacy Name Phone [address] [phone] Your Updated Medication List  
  
   
 This list is accurate as of 9/19/18  9:44 AM.  Always use your most recent med list.  
  
  
  
  
 aspirin 325 mg tablet Commonly known as:  ASPIRIN Take 1 Tab by mouth daily for 30 days. atorvastatin 40 mg tablet Commonly known as:  LIPITOR Take 1 Tab by mouth daily for 30 days. divalproex  mg ER tablet Commonly known as:  DEPAKOTE ER Take 1 Tab by mouth nightly for 30 days. hydrOXYzine HCl 50 mg tablet Commonly known as:  ATARAX Take 50 mg by mouth nightly. metoprolol succinate 25 mg XL tablet Commonly known as:  TOPROL-XL Take 1 Tab by mouth daily for 30 days. neomycin-bacitracin-polymyxin 3.5mg-400 unit- 5,000 unit/gram ointment Commonly known as:  TRIPLE ANTIBIOTIC Apply  to affected area two (2) times a day. Prescriptions Sent to Pharmacy Refills  
 divalproex ER (DEPAKOTE ER) 500 mg ER tablet 2 Sig: Take 1 Tab by mouth nightly for 30 days. Class: Normal  
 Pharmacy: [address] [place] [address] Ph #: [phone] Route: Oral  
 neomycin-bacitracin-polymyxin (TRIPLE ANTIBIOTIC) 3.5mg-400 unit- 5,000 unit/gram ointment 1 Sig: Apply  to affected area two (2) times a day. Class: Normal  
 Pharmacy: [address] [place] [address], [address] [place] [address] Ph #: [phone] Route: Topical  
  
We Performed the Following HEPATIC FUNCTION PANEL [15795 CPT(R)] VALPROIC ACID [03597 CPT(R)] Follow-up Instructions Return in about 4 weeks (around 10/17/2018). To-Do List   
 09/19/2018 Neurology:  EEG Patient Instructions All test results will be discussed at the next appointment. Electroencephalogram (EEG): About This Test 
What is it? An electroencephalogram (EEG) lets a doctor see the electrical activity of your brain.  
You will have small pads or patches attached to different places on your head. These are called electrodes. Wires connect the electrodes to a computer. The computer records the activity of the brain. This looks like wavy lines on the computer screen or on paper. Why is this test done? The test is often used to diagnose epilepsy. It helps a doctor know what types of seizures are happening. An EEG can also check brain activity in people with sleep disorders. It can also help a doctor know why a person passed out (lost consciousness). How can you prepare for the test? 
· Tell your doctor if you are taking any medicines. Your doctor may ask you to stop taking certain medicines before the test. These include sedatives and tranquilizers, muscle relaxants, sleeping aids, and seizure medicines. · Do not eat or drink anything with caffeine in it for 12 hours before the test. This includes cola, energy drinks, and chocolate. · Shampoo your hair and rinse with clear water the evening before or the morning of the test. Do not put any hair conditioner or oil on after you wash your hair. · Your doctor may ask you not to sleep the night before the test or to sleep for only about 4 or 5 hours. This is because some types of brain activity can only be seen if you are asleep. If your doctor asks you to get less sleep than normal, plan to have someone drive you to and from the test. 
What happens during the test? 
· You will lie on your back on a bed or table. Or you might relax in a chair with your eyes closed. · A technologist will attach the electrodes to different places on your head. Or you might get a cap with fixed electrodes on it. · You will lie still with your eyes closed. The technologist will tell you not to talk unless you need to. · The technologist may ask you to: ¨ Breathe deeply and rapidly. This is called hyperventilating. ¨ Look at a bright, flashing light called a strobe. ¨ Go to sleep. If you can't fall asleep, you may get medicine to help you. What else should you know about the test? 
· There is no pain. No electrical current goes through your body. · If you have a seizure disorder such as epilepsy, the flashing lights or hyperventilation may cause a seizure. The technologist is trained to take care of you if this happens. · If electrodes are put in your nose, they may tickle. In rare cases, they can also cause some soreness or a small amount of bleeding for 1 to 2 days after the test. 
How long does the test take? · The test will take about 1 to 2 hours. What happens after the test? 
· You will probably be able to go home right away. But if you didn't sleep your normal amount before the test, have someone drive you home. · You can go back to your usual activities right away. When should you call for help? Watch closely for changes in your health, and be sure to contact your doctor if: 
· You have any problems that you think may be from the test. 
· You have any questions about the test or have not received your results. Follow-up care is a key part of your treatment and safety. Be sure to make and go to all appointments, and call your doctor if you are having problems. It's also a good idea to keep a list of the medicines you take. Ask your doctor when you can expect to have your test results. Where can you learn more? Go to http://sanjiv-maria m.info/. Enter F196 in the search box to learn more about \"Electroencephalogram (EEG): About This Test.\" Current as of: October 9, 2017 Content Version: 11.7 © 1753-8177 [employer]. Care instructions adapted under license by [employer] (which disclaims liability or warranty for this information). If you have questions about a medical condition or this instruction, always ask your healthcare professional. [place] [address] any warranty or liability for your use of this information. Introducing hospitals & HEALTH SERVICES! [employer] introduces [employer] patient portal. Now you can access parts of your medical record, email your doctor's office, and request medication refills online. 1. In your internet browser, go to [email or web address] 2. Click on the First Time User? Click Here link in the Sign In box. You will see the New Member Sign Up page. 3. Enter your [employer] Access Code exactly as it appears below. You will not need to use this code after youve completed the sign-up process. If you do not sign up before the expiration date, you must request a new code. · [employer] Access Code: [email or web address] Expires: 12/5/2018  7:04 PM 
 
4. Enter the last four digits of your Social Security Number (xxxx) and Date of Birth (mm/dd/yyyy) as indicated and click Submit. You will be taken to the next sign-up page. 5. Create a [employer] ID. This will be your [employer] login ID and cannot be changed, so think of one that is secure and easy to remember. 6. Create a [employer] password. You can change your password at any time. 7. Enter your Password Reset Question and Answer. This can be used at a later time if you forget your password. 8. Enter your e-mail address. You will receive e-mail notification when new information is available in [employer]. 9. Click Sign Up. You can now view and download portions of your medical record. 10. Click the Download Summary menu link to download a portable copy of your medical information. If you have questions, please visit the Frequently Asked Questions section of the [employer] website. Remember, [employer] is NOT to be used for urgent needs. For medical emergencies, dial 911. Now available from your iPhone and Android! Please provide this summary of care documentation to your next provider. Your primary care clinician is listed as Blayne Bojorquez. If you have any questions after today's visit, please call [phone].

## 2018-09-20 LAB
ALBUMIN SERPL-MCNC: 4.1 G/DL (ref 3.6–4.8)
ALP SERPL-CCNC: 88 IU/L (ref 39–117)
ALT SERPL-CCNC: 59 IU/L (ref 0–44)
AST SERPL-CCNC: 63 IU/L (ref 0–40)
BILIRUB DIRECT SERPL-MCNC: 0.07 MG/DL (ref 0–0.4)
BILIRUB SERPL-MCNC: <0.2 MG/DL (ref 0–1.2)
PROT SERPL-MCNC: 7 G/DL (ref 6–8.5)
VALPROATE SERPL-MCNC: 89 UG/ML (ref 50–100)

## 2018-09-27 ENCOUNTER — HOSPITAL ENCOUNTER (OUTPATIENT)
Dept: NEUROLOGY | Age: 63
Discharge: HOME | End: 2018-09-27
Attending: PSYCHIATRY & NEUROLOGY
Payer: MEDICARE

## 2018-09-27 DIAGNOSIS — F07.81 POST-TRAUMATIC BRAIN SYNDROME: ICD-10-CM

## 2018-09-27 DIAGNOSIS — G40.209 PARTIAL SYMPTOMATIC EPILEPSY WITH COMPLEX PARTIAL SEIZURES, NOT INTRACTABLE, WITHOUT STATUS EPILEPTICUS (HCC): ICD-10-CM

## 2018-09-27 PROCEDURE — 95816 EEG AWAKE AND DROWSY: CPT

## 2018-09-28 NOTE — PROCEDURES
[address]Jennifer  MR#: [id number]  : 1955  ACCOUNT #: [de-identified]   DATE OF SERVICE: 2018    EXAMINATION NUMBER: [id number]. DESCRIPTION OF PROCEDURE:  The electrodes were applied in accordance with the international 10-20 system of electrode placement. EEG was reviewed in both bipolar and referential montages. Hyperventilation and photic stimulation were not performed. FINDINGS:  The background is a symmetric 8-9 Hz alpha rhythm. This is best seen in the posterior leads. IMPRESSION:  This is a normal electroencephalogram.  The absence of seizures on an electroencephalogram does not eliminate the diagnosis of epilepsy. Clinical correlation is advised.       MD DEMI Isaac /   D: 2018 16:19     T: 2018 19:52  JOB #: [id number]

## 2018-10-03 ENCOUNTER — HOSPITAL ENCOUNTER (OUTPATIENT)
Dept: ULTRASOUND IMAGING | Age: 63
Discharge: HOME | End: 2018-10-03
Attending: INTERNAL MEDICINE
Payer: MEDICARE

## 2018-10-03 DIAGNOSIS — B18.2 HEPATITIS C, CHRONIC (HCC): ICD-10-CM

## 2018-10-03 PROCEDURE — 76700 US EXAM ABDOM COMPLETE: CPT

## 2018-10-09 ENCOUNTER — APPOINTMENT (OUTPATIENT)
Dept: CT IMAGING | Age: 63
End: 2018-10-09
Attending: EMERGENCY MEDICINE
Payer: MEDICARE

## 2018-10-09 ENCOUNTER — APPOINTMENT (OUTPATIENT)
Dept: GENERAL RADIOLOGY | Age: 63
End: 2018-10-09
Attending: EMERGENCY MEDICINE
Payer: MEDICARE

## 2018-10-09 ENCOUNTER — HOSPITAL ENCOUNTER (EMERGENCY)
Age: 63
Discharge: HOME | End: 2018-10-09
Attending: EMERGENCY MEDICINE
Payer: MEDICARE

## 2018-10-09 VITALS
RESPIRATION RATE: 17 BRPM | SYSTOLIC BLOOD PRESSURE: 115 MMHG | BODY MASS INDEX: 20.59 KG/M2 | WEIGHT: 143.5 LBS | DIASTOLIC BLOOD PRESSURE: 65 MMHG | OXYGEN SATURATION: 99 % | HEART RATE: 54 BPM | TEMPERATURE: 98.5 F

## 2018-10-09 DIAGNOSIS — G40.909 SEIZURE DISORDER (HCC): Primary | ICD-10-CM

## 2018-10-09 LAB
ANION GAP SERPL CALC-SCNC: 7 MMOL/L (ref 5–15)
BASOPHILS # BLD: 0 K/UL (ref 0–0.1)
BASOPHILS NFR BLD: 0 % (ref 0–1)
BUN SERPL-MCNC: 16 MG/DL (ref 6–20)
BUN/CREAT SERPL: 12 (ref 12–20)
CALCIUM SERPL-MCNC: 8.4 MG/DL (ref 8.5–10.1)
CHLORIDE SERPL-SCNC: 106 MMOL/L (ref 97–108)
CK SERPL-CCNC: 196 U/L (ref 39–308)
CO2 SERPL-SCNC: 31 MMOL/L (ref 21–32)
CREAT SERPL-MCNC: 1.29 MG/DL (ref 0.7–1.3)
DIFFERENTIAL METHOD BLD: ABNORMAL
EOSINOPHIL # BLD: 0.1 K/UL (ref 0–0.4)
EOSINOPHIL NFR BLD: 1 % (ref 0–7)
ERYTHROCYTE [DISTWIDTH] IN BLOOD BY AUTOMATED COUNT: 12.1 % (ref 11.5–14.5)
ETHANOL SERPL-MCNC: <10 MG/DL
GLUCOSE SERPL-MCNC: 109 MG/DL (ref 65–100)
HCT VFR BLD AUTO: 35 % (ref 36.6–50.3)
HGB BLD-MCNC: 11.1 G/DL (ref 12.1–17)
IMM GRANULOCYTES # BLD: 0 K/UL (ref 0–0.04)
IMM GRANULOCYTES NFR BLD AUTO: 0 % (ref 0–0.5)
LYMPHOCYTES # BLD: 0.8 K/UL (ref 0.8–3.5)
LYMPHOCYTES NFR BLD: 19 % (ref 12–49)
MCH RBC QN AUTO: 29.5 PG (ref 26–34)
MCHC RBC AUTO-ENTMCNC: 31.7 G/DL (ref 30–36.5)
MCV RBC AUTO: 93.1 FL (ref 80–99)
MONOCYTES # BLD: 0.5 K/UL (ref 0–1)
MONOCYTES NFR BLD: 12 % (ref 5–13)
NEUTS SEG # BLD: 2.8 K/UL (ref 1.8–8)
NEUTS SEG NFR BLD: 67 % (ref 32–75)
NRBC # BLD: 0 K/UL (ref 0–0.01)
NRBC BLD-RTO: 0 PER 100 WBC
PLATELET # BLD AUTO: 157 K/UL (ref 150–400)
PMV BLD AUTO: 10.7 FL (ref 8.9–12.9)
POTASSIUM SERPL-SCNC: 3.5 MMOL/L (ref 3.5–5.1)
RBC # BLD AUTO: 3.76 M/UL (ref 4.1–5.7)
SODIUM SERPL-SCNC: 144 MMOL/L (ref 136–145)
VALPROATE SERPL-MCNC: 33 UG/ML (ref 50–100)
WBC # BLD AUTO: 4.2 K/UL (ref 4.1–11.1)

## 2018-10-09 PROCEDURE — 82550 ASSAY OF CK (CPK): CPT | Performed by: EMERGENCY MEDICINE

## 2018-10-09 PROCEDURE — 71045 X-RAY EXAM CHEST 1 VIEW: CPT

## 2018-10-09 PROCEDURE — 70450 CT HEAD/BRAIN W/O DYE: CPT

## 2018-10-09 PROCEDURE — 80164 ASSAY DIPROPYLACETIC ACD TOT: CPT | Performed by: EMERGENCY MEDICINE

## 2018-10-09 PROCEDURE — 96361 HYDRATE IV INFUSION ADD-ON: CPT

## 2018-10-09 PROCEDURE — 74011000258 HC RX REV CODE- 258: Performed by: EMERGENCY MEDICINE

## 2018-10-09 PROCEDURE — 80307 DRUG TEST PRSMV CHEM ANLYZR: CPT | Performed by: EMERGENCY MEDICINE

## 2018-10-09 PROCEDURE — 85025 COMPLETE CBC W/AUTO DIFF WBC: CPT | Performed by: EMERGENCY MEDICINE

## 2018-10-09 PROCEDURE — 96365 THER/PROPH/DIAG IV INF INIT: CPT

## 2018-10-09 PROCEDURE — 36415 COLL VENOUS BLD VENIPUNCTURE: CPT | Performed by: EMERGENCY MEDICINE

## 2018-10-09 PROCEDURE — 99285 EMERGENCY DEPT VISIT HI MDM: CPT

## 2018-10-09 PROCEDURE — 74011250636 HC RX REV CODE- 250/636: Performed by: EMERGENCY MEDICINE

## 2018-10-09 PROCEDURE — 80048 BASIC METABOLIC PNL TOTAL CA: CPT | Performed by: EMERGENCY MEDICINE

## 2018-10-09 PROCEDURE — 74011000250 HC RX REV CODE- 250: Performed by: EMERGENCY MEDICINE

## 2018-10-09 RX ORDER — SODIUM CHLORIDE 0.9 % (FLUSH) 0.9 %
5-10 SYRINGE (ML) INJECTION AS NEEDED
Status: DISCONTINUED | OUTPATIENT
Start: 2018-10-09 | End: 2018-10-10 | Stop reason: HOSPADM

## 2018-10-09 RX ORDER — DIVALPROEX SODIUM 500 MG/1
500 TABLET, DELAYED RELEASE ORAL 2 TIMES DAILY
Qty: 60 TAB | Refills: 0 | Status: SHIPPED | OUTPATIENT
Start: 2018-10-09 | End: 2018-11-08

## 2018-10-09 RX ORDER — DIVALPROEX SODIUM 500 MG/1
500 TABLET, EXTENDED RELEASE ORAL
Qty: 30 TAB | Refills: 2 | Status: SHIPPED | OUTPATIENT
Start: 2018-10-09 | End: 2018-11-08

## 2018-10-09 RX ORDER — SODIUM CHLORIDE 0.9 % (FLUSH) 0.9 %
5-10 SYRINGE (ML) INJECTION EVERY 8 HOURS
Status: DISCONTINUED | OUTPATIENT
Start: 2018-10-09 | End: 2018-10-10 | Stop reason: HOSPADM

## 2018-10-09 RX ADMIN — SODIUM CHLORIDE 1000 ML: 900 INJECTION, SOLUTION INTRAVENOUS at 19:34

## 2018-10-09 RX ADMIN — SODIUM CHLORIDE 500 MG: 900 INJECTION, SOLUTION INTRAVENOUS at 20:42

## 2018-10-09 NOTE — ED NOTES
Pt presents via stretcher by EMS to ED after being found unresponsive on the ground. Pt was given 5mg of Versed in route to ED by EMS. Pt has hx of seizure disorder per report given by EMS. Pt has altered mental status and drowsy, RR even and unlabored, skin is warm and dry. Assesment completed and pt updated on plan of care. Emergency Department Nursing Plan of Care The Nursing Plan of Care is developed from the Nursing assessment and Emergency Department Attending provider initial evaluation. The plan of care may be reviewed in the ED Provider note. The Plan of Care was developed with the following considerations:  
Patient / Family readiness to learn indicated by:pt altered mental status Persons(s) to be included in education: pt is altered at present time Barriers to Learning/Limitations:Cognitive/physical impairment: 
 
Signed Oneal Gonzales [facility]   
10/9/2018   7:05 PM

## 2018-10-09 NOTE — ED PROVIDER NOTES
EMERGENCY DEPARTMENT HISTORY AND PHYSICAL EXAM 
 
 
Date: 10/9/2018 Patient Name: Naseem Bryan. History of Presenting Illness Chief Complaint Patient presents with  Seizure History Provided By: EMS and Patient's Family HPI: Naseem Mcwilliams, 70 y.o. male with PMHx significant for seizure disorder, HTN, high cholesterol, migraine, and memory disorder, presents via EMS to the ED for evaluation of the pt after he experienced a seizure today PTA. EMS states that they found the pt lying unresponsive on the front stoop of his apartment. The patient's family reports that the pt was lying on the stoop for ~10-15 minutes before EMS arrived on the scene. EMS reports that the pt was in a postictal state when they arrived. EMS states that after the pt transitioned from a somnolent state to an awake state he became combative and needed to be restrained. EMS administered 5 mg of Versed en route to the [facility] ED which calmed the pt down. EMS states that the pt's vitals were good. The pt's family is unsure of what medications the pt has taken recently. PMHx: seizures, HTN, high cholesterol, migraine, and memory disorder PSHx: skull plating (1987) SHx: - EtOH, - tobacco, - illicit drugs The HPI is limited due to the pt's AMS. PCP: Leonard Ardon MD 
 
Current Facility-Administered Medications Medication Dose Route Frequency Provider Last Rate Last Dose  sodium chloride (NS) flush 5-10 mL  5-10 mL IntraVENous Q8H Allison Dover MD      
 sodium chloride (NS) flush 5-10 mL  5-10 mL IntraVENous PRN Allison Dover MD      
 valproate (DEPACON) 500 mg in 0.9% sodium chloride 50 mL IVPB  500 mg IntraVENous NOW Allison Dover MD 50 mL/hr at 10/09/18 2042 500 mg at 10/09/18 2042 Current Outpatient Prescriptions Medication Sig Dispense Refill  divalproex DR (DEPAKOTE) 500 mg tablet Take 1 Tab by mouth two (2) times a day for 30 days.  60 Tab 0  
  divalproex ER (DEPAKOTE ER) 500 mg ER tablet Take 1 Tab by mouth nightly for 30 days. 30 Tab 2  
 hydrOXYzine HCl (ATARAX) 50 mg tablet Take 50 mg by mouth nightly.  neomycin-bacitracin-polymyxin (TRIPLE ANTIBIOTIC) 3.5mg-400 unit- 5,000 unit/gram ointment Apply  to affected area two (2) times a day. 9 g 1  
 metoprolol succinate (TOPROL-XL) 25 mg XL tablet Take 1 Tab by mouth daily for 30 days. 30 Tab 0  
 atorvastatin (LIPITOR) 40 mg tablet Take 1 Tab by mouth daily for 30 days. 30 Tab 0  
 aspirin (ASPIRIN) 325 mg tablet Take 1 Tab by mouth daily for 30 days. 30 Tab 0 Past History Past Medical History: 
Past Medical History:  
Diagnosis Date  High cholesterol  HTN (hypertension)  Leg swelling  Memory disorder  Migraine 1987  Seizure ([address]  Seizures ([address] 30 years  Visual disturbance Past Surgical History: 
Past Surgical History:  
Procedure Laterality Date  HX OTHER SURGICAL    
 skull plating- 6515 Family History: 
Family History Problem Relation Age of Onset  Heart Disease Mother  Seizures Father  Headache Sister  Heart Disease Sister Social History: 
Social History Substance Use Topics  Smoking status: Never Smoker  Smokeless tobacco: Never Used  Alcohol use No  
 
 
Allergies: 
No Known Allergies Review of Systems Review of Systems Unable to perform ROS: Mental status change Physical Exam  
Physical Exam  
Constitutional: He appears well-developed and well-nourished. No distress. No obvious injury. HENT:  
Head: Normocephalic and atraumatic. Mouth/Throat: Oropharynx is clear and moist. No oropharyngeal exudate. Eyes: Conjunctivae and EOM are normal. Pupils are equal, round, and reactive to light. Left eye exhibits no discharge. Neck: Normal range of motion. Neck supple. No JVD present.   
Cardiovascular: Normal rate, regular rhythm, normal heart sounds, intact distal pulses and normal pulses. Pulmonary/Chest: Effort normal and breath sounds normal. No respiratory distress. He has no wheezes. Abdominal: Soft. Bowel sounds are normal. He exhibits no distension. There is no tenderness. There is no rebound and no guarding. Musculoskeletal: Normal range of motion. He exhibits no edema or tenderness. Lymphadenopathy:  
  He has no cervical adenopathy. Neurological: He is alert. He has normal reflexes. He is disoriented. No cranial nerve deficit or sensory deficit. No gross focal deficits. GCS 11. Skin: Skin is warm and dry. No rash noted. Psychiatric:  
Decreased level of consciousness. Drowsy. Confused. Postictal. 
 
 
  
Nursing note and vitals reviewed. Diagnostic Study Results Labs - Recent Results (from the past 12 hour(s)) CBC WITH AUTOMATED DIFF Collection Time: 10/09/18  7:26 PM  
Result Value Ref Range WBC 4.2 4.1 - 11.1 K/uL  
 RBC 3.76 (L) 4.10 - 5.70 M/uL  
 HGB 11.1 (L) 12.1 - 17.0 g/dL HCT 35.0 (L) 36.6 - 50.3 % MCV 93.1 80.0 - 99.0 FL  
 MCH 29.5 26.0 - 34.0 PG  
 MCHC 31.7 30.0 - 36.5 g/dL  
 RDW 12.1 11.5 - 14.5 % PLATELET 104 303 - 004 K/uL MPV 10.7 8.9 - 12.9 FL  
 NRBC 0.0 0  WBC ABSOLUTE NRBC 0.00 0.00 - 0.01 K/uL NEUTROPHILS 67 32 - 75 % LYMPHOCYTES 19 12 - 49 % MONOCYTES 12 5 - 13 % EOSINOPHILS 1 0 - 7 % BASOPHILS 0 0 - 1 % IMMATURE GRANULOCYTES 0 0.0 - 0.5 % ABS. NEUTROPHILS 2.8 1.8 - 8.0 K/UL  
 ABS. LYMPHOCYTES 0.8 0.8 - 3.5 K/UL  
 ABS. MONOCYTES 0.5 0.0 - 1.0 K/UL  
 ABS. EOSINOPHILS 0.1 0.0 - 0.4 K/UL  
 ABS. BASOPHILS 0.0 0.0 - 0.1 K/UL  
 ABS. IMM. GRANS. 0.0 0.00 - 0.04 K/UL  
 DF AUTOMATED METABOLIC PANEL, BASIC Collection Time: 10/09/18  7:26 PM  
Result Value Ref Range Sodium 144 136 - 145 mmol/L Potassium 3.5 3.5 - 5.1 mmol/L Chloride 106 97 - 108 mmol/L  
 CO2 31 21 - 32 mmol/L  Anion gap 7 5 - 15 mmol/L  
 Glucose 109 (H) 65 - 100 mg/dL BUN 16 6 - 20 MG/DL Creatinine 1.29 0.70 - 1.30 MG/DL  
 BUN/Creatinine ratio 12 12 - 20 GFR est AA >60 >60 ml/min/1.73m2 GFR est non-AA 56 (L) >60 ml/min/1.73m2 Calcium 8.4 (L) 8.5 - 10.1 MG/DL  
ETHYL ALCOHOL Collection Time: 10/09/18  7:26 PM  
Result Value Ref Range ALCOHOL(ETHYL),SERUM <10 <10 MG/DL  
VALPROIC ACID Collection Time: 10/09/18  7:26 PM  
Result Value Ref Range Valproic acid 33 (L) 50 - 100 ug/ml CK Collection Time: 10/09/18  7:26 PM  
Result Value Ref Range  39 - 308 U/L Radiologic Studies -  
CT Results  (Last 48 hours) 10/09/18 1924  CT HEAD WO CONT Final result Impression:  IMPRESSION: No acute process or change compared to the prior exam.  
   
   
  
 Narrative:  EXAM:  CT HEAD WO CONT INDICATION:   seizure, found unresponsive COMPARISON: 9/6/2018. CONTRAST:  None. TECHNIQUE: Unenhanced CT of the head was performed using 5 mm images. Brain and  
bone windows were generated. CT dose reduction was achieved through use of a  
standardized protocol tailored for this examination and automatic exposure  
control for dose modulation. FINDINGS:  
The ventricles and sulci are normal in size, shape and configuration and  
midline. Encephalomalacia is noted in the left frontal lobe related to prior  
craniotomy. There is no intracranial hemorrhage, extra-axial collection, mass,  
mass effect or midline shift. The basilar cisterns are open. No acute infarct  
is identified. The visualized portions of the paranasal sinuses and mastoid air  
cells are clear. CXR Results  (Last 48 hours) 10/09/18 1917  XR CHEST PORT Final result Impression:  Impression: No acute process. Narrative: Indication: Chest pain Comparison: 9/6/2018 Portable exam of the chest obtained at 1910 demonstrates normal heart size. There is no acute process in the lung fields. The osseous structures are  
unremarkable. Medical Decision Making I am the first provider for this patient. I reviewed the vital signs, available nursing notes, past medical history, past surgical history, family history and social history. Vital Signs-Reviewed the patient's vital signs. Patient Vitals for the past 12 hrs: 
 Temp Pulse Resp BP SpO2  
10/09/18 2058 - 64 20 114/74 100 % 10/09/18 1854 98.5 °F (36.9 °C) 88 18 128/71 95 % Records Reviewed: Nursing Notes and Old Medical Records Provider Notes (Medical Decision Making): DDx: AMS, seizure disorder, postictal 
 
ED Course:  
Initial assessment performed. The patients presenting problems have been discussed, and they are in agreement with the care plan formulated and outlined with them. I have encouraged them to ask questions as they arise throughout their visit. Critical Care Time: 0 minutes Disposition: 
DISCHARGE NOTE 
9:30 PM 
The patient has been re-evaluated and is ready for discharge. Reviewed available results with patient. Counseled pt on diagnosis and care plan. Pt has expressed understanding, and all questions have been answered. Pt agrees with plan and agrees to F/U as recommended, or return to the ED if their sxs worsen. Discharge instructions have been provided and explained to the pt, along with reasons to return to the ED. Written by Abbey Jose ED Scribe, as dictated by Alan Dobbs MD. 
 
PLAN: 
1. Current Discharge Medication List  
  
START taking these medications Details  
divalproex DR (DEPAKOTE) 500 mg tablet Take 1 Tab by mouth two (2) times a day for 30 days. Qty: 60 Tab, Refills: 0 CONTINUE these medications which have CHANGED Details  
divalproex ER (DEPAKOTE ER) 500 mg ER tablet Take 1 Tab by mouth nightly for 30 days. Qty: 30 Tab, Refills: 2 2. Follow-up Information Follow up With Details Comments Contact Info Jessy Schulz MD In 1 week  [address] [facility] 
[phone] Return to ED if worse Diagnosis Clinical Impression: 1. Seizure disorder ([address] Attestation: This note is prepared by Adela Galo, acting as Scribe for Katharine Jiang MD. Katharine Jiang MD: The scribe's documentation has been prepared under my direction and personally reviewed by me in its entirety. I confirm that the note above accurately reflects all work, treatment, procedures, and medical decision making performed by me.

## 2018-10-10 NOTE — DISCHARGE INSTRUCTIONS
Epilepsy: Care Instructions  Your Care Instructions    Epilepsy is a common condition that causes repeated seizures. The seizures are caused by bursts of electrical activity in the brain that aren't normal. Seizures may cause problems with muscle control, movement, speech, vision, or awareness. They can be scary. Epilepsy affects each person differently. Some people have only a few seizures. Others get them more often. If you know what triggers a seizure, you may be able to avoid having one. You can take medicines to control and reduce seizures. You and your doctor will need to find the right combination, schedule, and dose of medicine. This may take time and careful changes. Seizures may get worse and happen more often over time. Follow-up care is a key part of your treatment and safety. Be sure to make and go to all appointments, and call your doctor if you are having problems. It's also a good idea to know your test results and keep a list of the medicines you take. How can you care for yourself at home? · Be safe with medicines. Take your medicines exactly as prescribed. Call your doctor if you think you are having a problem with your medicine. · Make a treatment plan with your doctor. Be sure to follow your plan. · Try to identify and avoid things that may make you more likely to have a seizure. These may include:  ¨ Not getting enough sleep. ¨ Using drugs or alcohol. ¨ Being emotionally stressed. ¨ Skipping meals. · Keep a record of any seizures you have. Note the date, time of day, and any details about the seizure that you can remember. Your doctor can use this information to plan or adjust your medicine or other treatment. · Be sure that any doctor treating you for another condition knows that you have epilepsy. Each doctor should know what medicines you are taking, if any. · Wear a medical ID bracelet. You can buy this at most [employer].  If you have a seizure that leaves you unconscious or unable to speak for yourself, this bracelet will let those who are treating you know that you have epilepsy. · Talk to your doctor about whether it is safe for you to do certain activities, such as drive or swim. When should you call for help? Call 911 anytime you think you may need emergency care. For example, call if:    · A seizure does not stop as it normally does.     · You have new symptoms such as:  ¨ Numbness, tingling, or weakness on one side of your body or face. ¨ Vision changes. ¨ Trouble speaking or thinking clearly.    Call your doctor now or seek immediate medical care if:    · You have a fever.     · You have a severe headache.    Watch closely for changes in your health, and be sure to contact your doctor if:    · The normal pattern or features of your seizures change. Where can you learn more? Go to http://sanjiv-maria m.info/. Melanie Varghese in the search box to learn more about \"Epilepsy: Care Instructions. \"  Current as of: June 4, 2018  Content Version: 11.8  © 0455-1136 [employer]. Care instructions adapted under license by [employer] (which disclaims liability or warranty for this information). If you have questions about a medical condition or this instruction, always ask your healthcare professional. [address] any warranty or liability for your use of this information.

## 2018-10-10 NOTE — ED NOTES
Discharge instructions were given to the patient by Tila Valle.  
 
The patient left the Emergency Department ambulatory, alert and oriented and in no acute distress with 2 prescriptions. The patient was encouraged to call or return to the ED for worsening issues or problems and was encouraged to schedule a follow up appointment for continuing care. The patient verbalized understanding of discharge instructions and prescriptions, all questions were answered. The patient has no further concerns at this time.

## 2018-10-10 NOTE — ED NOTES
Pt's sisters are at bedside. Pt's sister stated she \"found out\" pt had a seizure on Sunday at [employer]. Pt's sister stated she witnessed pt having two seizures today, about five minutes apart from each other.

## 2018-10-10 NOTE — ED NOTES
Pt's sister, Rick Luna ([phone]) left contact info. Ms Mayra Mayberry states she will pick the pt up if he is discharged.

## 2018-11-27 ENCOUNTER — APPOINTMENT (OUTPATIENT)
Dept: GENERAL RADIOLOGY | Age: 63
End: 2018-11-27
Attending: EMERGENCY MEDICINE
Payer: MEDICARE

## 2018-11-27 ENCOUNTER — HOSPITAL ENCOUNTER (EMERGENCY)
Age: 63
Discharge: HOME | End: 2018-11-27
Attending: EMERGENCY MEDICINE
Payer: MEDICARE

## 2018-11-27 VITALS
OXYGEN SATURATION: 99 % | HEIGHT: 65 IN | TEMPERATURE: 98.2 F | WEIGHT: 162 LBS | RESPIRATION RATE: 18 BRPM | HEART RATE: 66 BPM | DIASTOLIC BLOOD PRESSURE: 79 MMHG | SYSTOLIC BLOOD PRESSURE: 130 MMHG | BODY MASS INDEX: 26.99 KG/M2

## 2018-11-27 DIAGNOSIS — J06.9 ACUTE URI: Primary | ICD-10-CM

## 2018-11-27 PROCEDURE — 71046 X-RAY EXAM CHEST 2 VIEWS: CPT

## 2018-11-27 PROCEDURE — 99282 EMERGENCY DEPT VISIT SF MDM: CPT

## 2018-11-27 RX ORDER — AZITHROMYCIN 250 MG/1
TABLET, FILM COATED ORAL
Qty: 6 TAB | Refills: 0 | Status: SHIPPED | OUTPATIENT
Start: 2018-11-27 | End: 2018-12-02

## 2018-11-27 NOTE — ED PROVIDER NOTES
EMERGENCY DEPARTMENT HISTORY AND PHYSICAL EXAM 
 
 
Date: 11/27/2018 Patient Name: Vikki Carney. History of Presenting Illness No chief complaint on file. History Provided By: Patient HPI: Vikki Carney., 70 y.o. male with PMHx significant for seizures, HTN, hypercholesterolemia, and migraines, presents ambulatory to the ED with cc of constant, gradually worsening, cough with sputum production beginning two weeks ago along with associated mild, non radiating, left chest pain, shortness of breath, fever and night sweats. Pt reports his sxs are consistent with his prior hx of Pneumonia. Additionally, he notes he has an appointment coming up with his neurologist. He denies any other alleviating or exacerbating factors. Pt specifically denies any decreased appetite, congestion, sore throat, rhinorrhea,  LOC, HA, fatigue, lightheadedness, weakness, heart palpitations, or leg swelling. Chief Complaint: Cough Duration: 2 Weeks Timing:  Gradual, Constant and Worsening Location: Left chest 
Quality: None Severity: Mild Modifying Factors: None Associated Symptoms: left chest pain, SOB, fever, night sweats There are no other complaints, changes, or physical findings at this time. PCP: Trevor Ordaz MD 
 
Current Outpatient Medications Medication Sig Dispense Refill  azithromycin (ZITHROMAX Z-RAZ) 250 mg tablet Take 2 tabs on day 1, then 1 tab daily for the next 4 days 6 Tab 0  
 hydrOXYzine HCl (ATARAX) 50 mg tablet Take 50 mg by mouth nightly.  neomycin-bacitracin-polymyxin (TRIPLE ANTIBIOTIC) 3.5mg-400 unit- 5,000 unit/gram ointment Apply  to affected area two (2) times a day. 9 g 1 Past History Past Medical History: 
Past Medical History:  
Diagnosis Date  High cholesterol  HTN (hypertension)  Leg swelling  Memory disorder  Migraine 1987  Seizure ([facility] [address]  Seizures ([facility] [address] 30 years  Visual disturbance Past Surgical History: Past Surgical History:  
Procedure Laterality Date  HX OTHER SURGICAL    
 skull plating- 2981 Family History: 
Family History Problem Relation Age of Onset  Heart Disease Mother  Seizures Father  Headache Sister  Heart Disease Sister Social History: 
Social History Tobacco Use  Smoking status: Never Smoker  Smokeless tobacco: Never Used Substance Use Topics  Alcohol use: No  
 Drug use: No  
 
 
Allergies: 
No Known Allergies Review of Systems Review of Systems Constitutional: Positive for diaphoresis and fever. Negative for chills and fatigue. HENT: Negative for congestion, rhinorrhea, sneezing and sore throat. Eyes: Negative for redness and visual disturbance. Respiratory: Positive for cough (+sputum production) and shortness of breath. Cardiovascular: Positive for chest pain (+left). Negative for palpitations and leg swelling. Gastrointestinal: Positive for nausea. Negative for abdominal pain and vomiting. Genitourinary: Negative for difficulty urinating and frequency. Musculoskeletal: Negative for back pain, myalgias and neck stiffness. Skin: Negative for rash. Neurological: Negative for dizziness, syncope, weakness and headaches. Hematological: Negative for adenopathy. All other systems reviewed and are negative. Physical Exam  
Physical Exam  
Constitutional: He is oriented to person, place, and time. He appears well-developed and well-nourished. HENT:  
Head: Normocephalic and atraumatic. Mouth/Throat: Oropharynx is clear and moist and mucous membranes are normal.  
Eyes: EOM are normal.  
Neck: Normal range of motion and full passive range of motion without pain. Neck supple. Cardiovascular: Normal rate, regular rhythm, normal heart sounds, intact distal pulses and normal pulses. No murmur heard. Pulmonary/Chest: Effort normal and breath sounds normal. No respiratory distress. He exhibits no tenderness. Rhonchi in the left lower lobe Abdominal: Soft. Normal appearance and bowel sounds are normal. There is no tenderness. There is no rebound and no guarding. Neurological: He is alert and oriented to person, place, and time. He has normal strength. Skin: Skin is warm, dry and intact. No rash noted. No erythema. Psychiatric: He has a normal mood and affect. His speech is normal and behavior is normal. Judgment and thought content normal.  
Nursing note and vitals reviewed. Diagnostic Study Results Labs - No results found for this or any previous visit (from the past 12 hour(s)). Radiologic Studies -  
XR CHEST PA LAT Final Result CXR Results  (Last 48 hours)  
          
 11/27/18 1029  XR CHEST PA LAT Final result Impression:  Impression: No acute process or change compared to the prior exam.  
   
  
 Narrative:  Exam:  2 view chest  
   
Indication: Productive cough Comparison to 10/9/2018. PA and lateral views demonstrate normal heart size. There is no acute process in  
the lung fields. The osseous structures are unremarkable. Medical Decision Making I am the first provider for this patient. I reviewed the vital signs, available nursing notes, past medical history, past surgical history, family history and social history. Vital Signs-Reviewed the patient's vital signs. Patient Vitals for the past 12 hrs: 
 Temp Pulse Resp BP SpO2  
11/27/18 1014 98.2 °F (36.8 °C) 66 18 130/79 99 % Records Reviewed: Nursing Notes and Old Medical Records Provider Notes (Medical Decision Making): DDx: Bronchitis, pneumonia, URI, low concern for TB 
 
ED Course:  
Initial assessment performed. The patients presenting problems have been discussed, and they are in agreement with the care plan formulated and outlined with them. I have encouraged them to ask questions as they arise throughout their visit.  
 
  
PROGRESS NOTE:  
10:49 AM 
 Discussed imaging findings with pt. Vitals stable and well appearing. Likely viral vs bacterial. However, given night sweats and duration of symptoms, will emperically treat with antibiotics. He verbalizes agreement with treatment plan. Disposition: 
DISCHARGE NOTE 
10:49 AM 
The patient has been re-evaluated and is ready for discharge. Reviewed available results with patient and family. Counseled pt and family on diagnosis and care plan. Pt and family have expressed understanding, and all questions have been answered. Pt and family agree with plan and agree to F/U as recommended, or return to the ED if their sxs worsen. Discharge instructions have been provided and explained to the pt and their family, along with reasons to return to the ED. Written by Darrick Fang ED Scribe, as dictated by Som Marx MD. 
 
PLAN: 
1. Current Discharge Medication List  
  
START taking these medications Details  
azithromycin (ZITHROMAX Z-RAZ) 250 mg tablet Take 2 tabs on day 1, then 1 tab daily for the next 4 days Qty: 6 Tab, Refills: 0  
  
  
 
2. Follow-up Information Follow up With Specialties Details Why Contact Info Ana Sung MD Internal Medicine Schedule an appointment as soon as possible for a visit  [address] 
[phone] [facility] EMERGENCY DEPT Emergency Medicine  As needed, If symptoms worsen [address] Return to ED if worse Diagnosis Clinical Impression: 1. Acute URI Attestations: This note is prepared by Darrick Fang, acting as Scribe for Som Marx MD. Som Marx MD: The scribe's documentation has been prepared under my direction and personally reviewed by me in its entirety. I confirm that the note above accurately reflects all work, treatment, procedures, and medical decision making performed by me. This note will not be viewable in [facility].

## 2018-11-27 NOTE — ED NOTES
Pt for DC home. Plan of  Care accepted by pt and medication. Pt left unit steady gait. Patient (s)  given copy of dc instructions and 1 script(s). Patient (s)  verbalized understanding of instructions and script (s). Patient given a current medication reconciliation form and verbalized understanding of their medications. Patient (s) verbalized understanding of the importance of discussing medications with  his or her physician or clinic they will be following up with. Patient alert and oriented and in no acute distress. Patient discharged home ambulatory with self.

## 2018-11-27 NOTE — ED TRIAGE NOTES
Pt C/O productive cough for two weeks. Pt denies smoking hx. Pt STS having night sweats for the past two weeks since the coughing episodes.

## 2018-11-27 NOTE — DISCHARGE INSTRUCTIONS

## 2018-11-29 NOTE — PROGRESS NOTES
Visit charted 11/29/18. [facility] Volunteer in Emergency Room on 11/27/18. Documented by Rev. Cammy Urias, [facility]  paging service: [phone])

## 2019-01-23 RX ORDER — DIVALPROEX SODIUM 500 MG/1
500 TABLET, DELAYED RELEASE ORAL 2 TIMES DAILY
Qty: 60 TAB | Refills: 2 | Status: SHIPPED | OUTPATIENT
Start: 2019-01-23

## 2019-01-23 NOTE — TELEPHONE ENCOUNTER
----- Message from Jessee Damian sent at 1/23/2019 12:17 PM EST -----  Regarding: Dr Kapadia/ ruma Bragg, sister, called because the pt needs a refill on his \"depakote\" medication. He has been without it for four days and the Rx should go to the [facility] on file.       Best contact number is [phone]

## 2019-01-23 NOTE — TELEPHONE ENCOUNTER
----- Message from Jessee Damian sent at 1/23/2019 12:17 PM EST -----  Regarding: Dr Kapadia/ refill  Refugia Singh, sister, called because the pt needs a refill on his \"depakote\" medication. He has been without it for four days and the Rx should go to the [facility] on file.       Best contact number is [phone]

## 2019-01-24 NOTE — TELEPHONE ENCOUNTER
Called patient's sister Latoya Covington, [place] of the patient verified times 2. Latoya Covington made aware prescription is available to  at [facility]. Latoya Covington stated Mitch kahn was referred back to [facility] by his new PCP Dr. Jennifer Gonzalez. The office can cancel the refill request for Depakote because he was started on different medications. Prescription was cancelled spoke with the pharmacy Larry Lopez at [facility]. Prescription for the Depakote was cancelled.

## 2019-04-16 ENCOUNTER — HOSPITAL ENCOUNTER (EMERGENCY)
Age: 64
Discharge: HOME | End: 2019-04-17
Attending: PHYSICIAN ASSISTANT
Payer: MEDICARE

## 2019-04-16 ENCOUNTER — APPOINTMENT (OUTPATIENT)
Dept: GENERAL RADIOLOGY | Age: 64
End: 2019-04-16
Attending: PHYSICIAN ASSISTANT
Payer: MEDICARE
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
## 2019-04-16 DIAGNOSIS — S22.32XA CLOSED FRACTURE OF ONE RIB OF LEFT SIDE, INITIAL ENCOUNTER: Primary | ICD-10-CM

## 2019-04-16 DIAGNOSIS — S70.02XA CONTUSION OF LEFT HIP, INITIAL ENCOUNTER: ICD-10-CM

## 2019-04-16 PROCEDURE — 99284 EMERGENCY DEPT VISIT MOD MDM: CPT

## 2019-04-16 PROCEDURE — 77030027138 HC INCENT SPIROMETER -A

## 2019-04-16 PROCEDURE — 71101 X-RAY EXAM UNILAT RIBS/CHEST: CPT

## 2019-04-16 RX ORDER — IBUPROFEN 400 MG/1
800 TABLET ORAL
Status: COMPLETED | OUTPATIENT
Start: 2019-04-16 | End: 2019-04-17

## 2019-04-17 ENCOUNTER — APPOINTMENT (OUTPATIENT)
Dept: CT IMAGING | Age: 64
End: 2019-04-17
Attending: PHYSICIAN ASSISTANT
Payer: MEDICARE

## 2019-04-17 ENCOUNTER — APPOINTMENT (OUTPATIENT)
Dept: GENERAL RADIOLOGY | Age: 64
End: 2019-04-17
Attending: EMERGENCY MEDICINE
Payer: MEDICARE

## 2019-04-17 VITALS
WEIGHT: 148 LBS | HEIGHT: 65 IN | HEART RATE: 60 BPM | OXYGEN SATURATION: 100 % | BODY MASS INDEX: 24.66 KG/M2 | DIASTOLIC BLOOD PRESSURE: 61 MMHG | TEMPERATURE: 97.9 F | SYSTOLIC BLOOD PRESSURE: 109 MMHG | RESPIRATION RATE: 18 BRPM

## 2019-04-17 PROCEDURE — 73502 X-RAY EXAM HIP UNI 2-3 VIEWS: CPT

## 2019-04-17 PROCEDURE — 72125 CT NECK SPINE W/O DYE: CPT

## 2019-04-17 PROCEDURE — 74011250637 HC RX REV CODE- 250/637: Performed by: PHYSICIAN ASSISTANT

## 2019-04-17 PROCEDURE — 70450 CT HEAD/BRAIN W/O DYE: CPT

## 2019-04-17 RX ORDER — IBUPROFEN 800 MG/1
800 TABLET ORAL
Qty: 20 TAB | Refills: 0 | Status: SHIPPED | OUTPATIENT
Start: 2019-04-17

## 2019-04-17 RX ADMIN — IBUPROFEN 800 MG: 400 TABLET ORAL at 01:07

## 2019-04-17 NOTE — ED NOTES
Pt arrived to ED via EMS with c/o left upper arm and left rib pain r/t Fall PTA. EMS reports patient was getting out of tub, slipped and fell hitting left rib cage and left upper arm. Pt. Was recently seen for the same type of injury and transferred. Pt. Denies hitting head and LOC. Lungs clear. Pt is in no acute distress. Will continue to monitor. See nursing assessment. Safety precautions in place; call light within reach. Emergency Department Nursing Plan of Care The Nursing Plan of Care is developed from the Nursing assessment and Emergency Department Attending provider initial evaluation. The plan of care may be reviewed in the ED Provider note. The Plan of Care was developed with the following considerations:  
Patient / Family readiness to learn indicated by:verbalized understanding Persons(s) to be included in education: patient Barriers to Learning/Limitations:No 
 
Signed Dino Holly RN   
4/16/2019   10:43 PM

## 2019-04-17 NOTE — ED NOTES
Radiology at bedside Per radiology tech, pt stood up from wheelchair and got into without assistance.

## 2019-04-17 NOTE — ED PROVIDER NOTES
EMERGENCY DEPARTMENT HISTORY AND PHYSICAL EXAM 
 
 
Date: 4/16/2019 Patient Name: Bhupinder Locke. History of Presenting Illness Chief Complaint Patient presents with  Fall PTA getting out of Tub History Provided By: Patient HPI: Bhupinder Hudson, 75 y.o. male with PMHx significant for seizure disorder, presents via EMS to the ED with cc of 1 hour of 10 out of 10 constant, aching left-sided rib pain that is worse with palpation and started this evening when he slipped on a wet floor in the bathroom and fell onto his left side. He tells me he lives in a house where he has a room to the bathroom is commonly shared by the residents he was cleaning up the floor when he slipped. He has clear recall of all events prior to and following the incident. He confidently denies not having a seizure and states he is compliant with his seizure medication. Tells me he did not hit his head and has no neck pain. There are no other complaints, changes, or physical findings at this time. PCP: Rios Neves MD 
 
No current facility-administered medications on file prior to encounter. Current Outpatient Medications on File Prior to Encounter Medication Sig Dispense Refill  divalproex DR (DEPAKOTE) 500 mg tablet Take 1 Tab by mouth two (2) times a day. 60 Tab 2  
 hydrOXYzine HCl (ATARAX) 50 mg tablet Take 50 mg by mouth nightly.  neomycin-bacitracin-polymyxin (TRIPLE ANTIBIOTIC) 3.5mg-400 unit- 5,000 unit/gram ointment Apply  to affected area two (2) times a day. 9 g 1 Past History Past Medical History: 
Past Medical History:  
Diagnosis Date  High cholesterol  HTN (hypertension)  Leg swelling  Memory disorder  Migraine 1987  Seizure ([address]  Seizures ([address] 30 years  Visual disturbance Past Surgical History: 
Past Surgical History:  
Procedure Laterality Date  HX OTHER SURGICAL    
 skull plating- 2237 Family History: Family History Problem Relation Age of Onset  Heart Disease Mother  Seizures Father  Headache Sister  Heart Disease Sister Social History: 
Social History Tobacco Use  Smoking status: Never Smoker  Smokeless tobacco: Never Used Substance Use Topics  Alcohol use: No  
 Drug use: No  
 
 
Allergies: 
No Known Allergies Review of Systems Review of Systems Constitutional: Negative for fatigue and fever. HENT: Negative for congestion, ear pain and rhinorrhea. Eyes: Negative for pain and redness. Respiratory: Negative for cough and wheezing. Left-sided rib pain  
Cardiovascular: Negative for chest pain and palpitations. Gastrointestinal: Negative for abdominal pain, nausea and vomiting. Genitourinary: Negative for dysuria, frequency and urgency. Musculoskeletal: Negative for back pain, neck pain and neck stiffness. Skin: Negative for rash and wound. Neurological: Negative for weakness, light-headedness, numbness and headaches. Physical Exam  
Physical Exam  
Constitutional: He is oriented to person, place, and time. He appears well-developed and well-nourished. Non-toxic appearance. No distress. HENT:  
Head: Normocephalic and atraumatic. Head is without right periorbital erythema and without left periorbital erythema. Right Ear: External ear normal.  
Left Ear: External ear normal.  
Nose: Nose normal.  
Mouth/Throat: Uvula is midline. No trismus in the jaw. Eyes: Pupils are equal, round, and reactive to light. Conjunctivae and EOM are normal. No scleral icterus. Neck: Normal range of motion and full passive range of motion without pain. Cardiovascular: Normal rate, regular rhythm and normal heart sounds. Pulmonary/Chest: Effort normal and breath sounds normal. No accessory muscle usage. No tachypnea. No respiratory distress. He has no decreased breath sounds. He has no wheezes. He exhibits tenderness and bony tenderness. Symmetric, full chest wall expansion with deep inspiration. Breathing unlabored; lungs are clear No bruising, redness or swelling Left sided rib tenderness Abdominal: Soft. There is no tenderness. There is no rigidity and no guarding. Musculoskeletal: Normal range of motion. Neurological: He is alert and oriented to person, place, and time. He is not disoriented. No cranial nerve deficit or sensory deficit. GCS eye subscore is 4. GCS verbal subscore is 5. GCS motor subscore is 6. Skin: Skin is intact. No rash noted. Psychiatric: He has a normal mood and affect. His speech is normal.  
Nursing note and vitals reviewed. Diagnostic Study Results Labs - No results found for this or any previous visit (from the past 12 hour(s)). Radiologic Studies -  
XR HIP LT W OR WO PELV 2-3 VWS Final Result IMPRESSION: No acute abnormality. Mild bilateral hip degenerative changes. CT HEAD WO CONT Final Result IMPRESSION:   
No acute intracranial abnormality. CT SPINE CERV WO CONT Final Result IMPRESSION: No acute fracture or subluxation. Degenerative changes at C2-3 and  
C3-4. XR RIBS LT W PA CXR MIN 3 V Final Result IMPRESSION: Acute nondisplaced fracture of the lateral left ninth rib. Clear  
lungs. CT Results  (Last 48 hours) 04/17/19 0128  CT HEAD WO CONT Final result Impression:  IMPRESSION:   
No acute intracranial abnormality. Narrative:  EXAM:  CT head without contrast  
   
INDICATION: Fall with head injury. COMPARISON: CT 10/9/2018 TECHNIQUE: Axial noncontrast head CT from foramen magnum to vertex. Coronal and  
sagittal reformatted images were obtained. CT dose reduction was achieved  
through use of a standardized protocol tailored for this examination and  
automatic exposure control for dose modulation. FINDINGS:  There is diffuse age-related parenchymal volume loss. The ventricles and sulci are age-appropriate without hydrocephalus. There is no mass effect or  
midline shift. There is no intracranial hemorrhage or extra-axial fluid  
collection. Left frontal encephalomalacia is unchanged. There is no new abnormal  
parenchymal attenuation. The basal cisterns are patent. A left frontal craniotomy is again noted. The visualized paranasal sinuses and  
mastoid air cells are clear. 04/17/19 0128  CT SPINE CERV WO CONT Final result Impression:  IMPRESSION: No acute fracture or subluxation. Degenerative changes at C2-3 and  
C3-4. Narrative:  EXAM:  CT C-spine without contrast  
   
INDICATION: Neck pain after fall with head injury. COMPARISON: None. TECHNIQUE: Thin section axial noncontrast CT of the cervical spine with coronal  
and sagittal reformats. CT dose reduction was achieved through use of a  
standardized protocol tailored for this examination and automatic exposure  
control for dose modulation. FINDINGS: There is no acute fracture or subluxation. Vertebral body heights are  
maintained. There is mild intervertebral disc space narrowing with anterior  
osteophytes at C2-3 and C3-4. There is no spinal canal stenosis. There is  
bilateral foraminal stenosis at C3-4. CXR Results  (Last 48 hours) None Medical Decision Making I am the first provider for this patient. I reviewed the vital signs, available nursing notes, past medical history, past surgical history, family history and social history. Vital Signs-Reviewed the patient's vital signs. No data found. Pulse Oximetry Analysis -100 % on RA Records Reviewed: Nursing Notes, Old Medical Records, Previous Radiology Studies, Previous Laboratory Studies and  Provider Notes (Medical Decision Making): DDx: Rib fracture, pneumothorax, contusion, closed head injury, cervical fracture ED Course: Initial assessment performed. The patients presenting problems have been discussed, and they are in agreement with the care plan formulated and outlined with them. I have encouraged them to ask questions as they arise throughout their visit. Disposition: 
Discharge PLAN: 
1. Discharge Medication List as of 4/17/2019  5:00 AM  
  
 
2. Follow-up Information Follow up With Specialties Details Why Contact Info Loree Mera MD Internal Medicine Schedule an appointment as soon as possible for a visit PRIMARY CARE: call to schedule follow up [place] [address] [address] [facility] [id number] [phone] Return to ED if worse Diagnosis Clinical Impression: 1. Closed fracture of one rib of left side, initial encounter 2. Fall from slipping on wet surface, initial encounter 3. Contusion of left hip, initial encounter

## 2019-04-17 NOTE — ED NOTES
Spoke with Trip Montes, patient's sister, and she stated no one will be home to receive patient as they are asleep and that she lives across town and does not have a car. Donna states [facility] ED needs to keep patient at their facility till the morning. Explained to her that patient has been discharged and no reason to hold patient. Sister states patient is ambulatory at home, however, patient is now stating he is unable to walk as \"I am broke\". Dr. Karen Saldivar notified and will be in to speak with patient. Patient was placed in waiting room when discharged and will be pulled back to a room.

## 2019-04-17 NOTE — ED NOTES
Pt ambulated with 2 RNs. Pt was asked if he feels comfortable ambulating from cab to house once he is home, pt responded yes.

## 2019-04-17 NOTE — ED NOTES
Discharge instructions were given to the patient by Claudia Powers.  
 
The patient left the Emergency Department ambulatory, alert and oriented and in no acute distress with 1 prescription. The patient was encouraged to call or return to the ED for worsening issues or problems and was encouraged to schedule a follow up appointment for continuing care. The patient verbalized understanding of discharge instructions and prescriptions, all questions were answered. The patient has no further concerns at this time.

## 2019-05-02 ENCOUNTER — TELEPHONE (OUTPATIENT)
Dept: CASE MANAGEMENT | Age: 64
End: 2019-05-02

## 2019-05-02 NOTE — TELEPHONE ENCOUNTER
ED follow-up call  CM given referral to follow-up with pt [phone]. Pt declining wanting any follow-up appointment and [facility].     Jenni Cross, MSW  [phone]

## 2019-05-02 NOTE — TELEPHONE ENCOUNTER
ED Follow-up   CM given referral to follow-up with pt. Pt declined wanting a PCP follow-up and HH.  Pt was asked to call CM back if he barron

## 2019-09-14 ENCOUNTER — APPOINTMENT (OUTPATIENT)
Dept: GENERAL RADIOLOGY | Age: 64
End: 2019-09-14
Attending: EMERGENCY MEDICINE
Payer: MEDICARE

## 2019-09-14 ENCOUNTER — HOSPITAL ENCOUNTER (EMERGENCY)
Age: 64
Discharge: ANOTHER HOSPITAL | End: 2019-09-14
Attending: EMERGENCY MEDICINE
Payer: MEDICARE

## 2019-09-14 VITALS
DIASTOLIC BLOOD PRESSURE: 89 MMHG | TEMPERATURE: 97.8 F | SYSTOLIC BLOOD PRESSURE: 137 MMHG | OXYGEN SATURATION: 100 % | HEIGHT: 65 IN | RESPIRATION RATE: 14 BRPM | WEIGHT: 167 LBS | BODY MASS INDEX: 27.82 KG/M2 | HEART RATE: 56 BPM

## 2019-09-14 DIAGNOSIS — S82.874A CLOSED NONDISPLACED PILON FRACTURE OF RIGHT TIBIA, INITIAL ENCOUNTER: Primary | ICD-10-CM

## 2019-09-14 LAB
ANION GAP BLD CALC-SCNC: 11 MMOL/L (ref 10–20)
BUN BLD-MCNC: 22 MG/DL (ref 9–20)
CA-I BLD-MCNC: 1.16 MMOL/L (ref 1.12–1.32)
CHLORIDE BLD-SCNC: 103 MMOL/L (ref 98–107)
CO2 BLD-SCNC: 30 MMOL/L (ref 21–32)
CREAT BLD-MCNC: 1.1 MG/DL (ref 0.6–1.3)
GLUCOSE BLD-MCNC: 112 MG/DL (ref 65–100)
HCT VFR BLD CALC: 41 % (ref 36.6–50.3)
POTASSIUM BLD-SCNC: 4.2 MMOL/L (ref 3.5–5.1)
SERVICE CMNT-IMP: ABNORMAL
SODIUM BLD-SCNC: 139 MMOL/L (ref 136–145)

## 2019-09-14 PROCEDURE — 99284 EMERGENCY DEPT VISIT MOD MDM: CPT

## 2019-09-14 PROCEDURE — 80047 BASIC METABLC PNL IONIZED CA: CPT

## 2019-09-14 PROCEDURE — 73590 X-RAY EXAM OF LOWER LEG: CPT

## 2019-09-14 PROCEDURE — 73610 X-RAY EXAM OF ANKLE: CPT

## 2019-09-14 PROCEDURE — 74011250636 HC RX REV CODE- 250/636: Performed by: EMERGENCY MEDICINE

## 2019-09-14 PROCEDURE — 74011250637 HC RX REV CODE- 250/637: Performed by: EMERGENCY MEDICINE

## 2019-09-14 PROCEDURE — 96374 THER/PROPH/DIAG INJ IV PUSH: CPT

## 2019-09-14 PROCEDURE — 75810000053 HC SPLINT APPLICATION

## 2019-09-14 RX ORDER — SODIUM CHLORIDE 0.9 % (FLUSH) 0.9 %
5-40 SYRINGE (ML) INJECTION EVERY 8 HOURS
Status: DISCONTINUED | OUTPATIENT
Start: 2019-09-14 | End: 2019-09-14 | Stop reason: HOSPADM

## 2019-09-14 RX ORDER — TRAMADOL HYDROCHLORIDE 50 MG/1
50 TABLET ORAL
Status: COMPLETED | OUTPATIENT
Start: 2019-09-14 | End: 2019-09-14

## 2019-09-14 RX ORDER — MORPHINE SULFATE 2 MG/ML
2 INJECTION, SOLUTION INTRAMUSCULAR; INTRAVENOUS
Status: COMPLETED | OUTPATIENT
Start: 2019-09-14 | End: 2019-09-14

## 2019-09-14 RX ADMIN — SODIUM CHLORIDE 1000 ML: 900 INJECTION, SOLUTION INTRAVENOUS at 17:07

## 2019-09-14 RX ADMIN — MORPHINE SULFATE 2 MG: 2 INJECTION, SOLUTION INTRAMUSCULAR; INTRAVENOUS at 17:07

## 2019-09-14 RX ADMIN — TRAMADOL HYDROCHLORIDE 50 MG: 50 TABLET, FILM COATED ORAL at 16:35

## 2019-09-14 NOTE — ED NOTES
Emergency Department Nursing Plan of Care       The Nursing Plan of Care is developed from the Nursing assessment and Emergency Department Attending provider initial evaluation. The plan of care may be reviewed in the ED Provider note. The Plan of Care was developed with the following considerations:   Patient / Family readiness to learn indicated by:verbalized understanding  Persons(s) to be included in education: patient  Barriers to Learning/Limitations:No    Signed     Erik Mustache    9/14/2019   4:31 PM      Patient is alert and oriented x 4 and in no acute distress at this time. Respirations are at a regular rate, depth, and pattern. Patient updated on plan of care and has no questions or concerns at this time. Call bell within reach. Will continue to monitor. Please reference nursing assessment.

## 2019-09-14 NOTE — ED PROVIDER NOTES
EMERGENCY DEPARTMENT HISTORY AND PHYSICAL EXAM      Date: 9/14/2019  Patient Name: Cas Caraballo. History of Presenting Illness     Chief Complaint   Patient presents with    Leg Pain     RLE pain after a limb fell on it       History Provided By: Patient    HPI: Cas Doe, 75 y.o. male with PMHx significant for HTN, seizures, hypercholesterolemia, presents via wheelchair to the ED with cc of throbbing RLE pain secondary to falling off of a ladder after it was struck by a tree limb 45 minutes ago. He denies hitting his head or LOC at the time of the fall. Pt denies any prior hx of trauma/injury to the RLE. He denies EtOH, tobacco, and illicit drug use. Pt denies taking any OTC medications PTA for his current pain. He specifically denies any fever, chills, SOB, CP, HA, N/V/D, abdominal pain, numbness, or rash. There are no other complaints, changes, or physical findings at this time. Social Hx: - EtOH; - Smoker; - Illicit Drugs    PCP: Nitish Estes MD    Current Facility-Administered Medications   Medication Dose Route Frequency Provider Last Rate Last Dose    sodium chloride (NS) flush 5-40 mL  5-40 mL IntraVENous Q8H Stepan Collado MD        morphine injection 2 mg  2 mg IntraVENous NOW Stepan Collado MD        sodium chloride 0.9 % bolus infusion 1,000 mL  1,000 mL IntraVENous NOW Stepan Collado MD         Current Outpatient Medications   Medication Sig Dispense Refill    divalproex DR (DEPAKOTE) 500 mg tablet Take 1 Tab by mouth two (2) times a day. 60 Tab 2    ibuprofen (MOTRIN) 800 mg tablet Take 1 Tab by mouth every eight (8) hours as needed for Pain. 20 Tab 0    hydrOXYzine HCl (ATARAX) 50 mg tablet Take 50 mg by mouth nightly.  neomycin-bacitracin-polymyxin (TRIPLE ANTIBIOTIC) 3.5mg-400 unit- 5,000 unit/gram ointment Apply  to affected area two (2) times a day.  9 g 1       Past History     Past Medical History:  Past Medical History:   Diagnosis Date    High cholesterol  HTN (hypertension)     Leg swelling     Memory disorder     Migraine     1987    Seizure ([facility] [address]     Seizures ([facility] [address]     30 years    Visual disturbance        Past Surgical History:  Past Surgical History:   Procedure Laterality Date    HX OTHER SURGICAL      skull plating- 24       Family History:  Family History   Problem Relation Age of Onset    Heart Disease Mother     Seizures Father     Headache Sister     Heart Disease Sister        Social History:  Social History     Tobacco Use    Smoking status: Never Smoker    Smokeless tobacco: Never Used   Substance Use Topics    Alcohol use: No    Drug use: No       Allergies:  No Known Allergies    Review of Systems   Review of Systems   Constitutional: Negative for chills and fever. HENT: Negative for sore throat and trouble swallowing. Eyes: Negative for photophobia and redness. Respiratory: Negative for cough and shortness of breath. Cardiovascular: Negative for chest pain and leg swelling. Gastrointestinal: Negative for abdominal pain, constipation, diarrhea, nausea and vomiting. Endocrine: Negative for polydipsia and polyuria. Genitourinary: Negative for dysuria, hematuria and scrotal swelling. Musculoskeletal: Positive for myalgias (RLE). Negative for back pain and joint swelling. Skin: Negative for rash. Neurological: Negative for dizziness, syncope, weakness and headaches. Psychiatric/Behavioral: Negative for suicidal ideas. All other systems reviewed and are negative. Physical Exam   Physical Exam   Constitutional: He is oriented to person, place, and time. He appears well-developed and well-nourished. HENT:   Head: Normocephalic and atraumatic. Mouth/Throat: Oropharynx is clear and moist and mucous membranes are normal.   Eyes: EOM are normal.   Neck: Normal range of motion and full passive range of motion without pain. Neck supple.    Cardiovascular: Normal rate, regular rhythm, normal heart sounds, intact distal pulses and normal pulses. No murmur heard. Pulses:       Dorsalis pedis pulses are 2+ on the right side, and 2+ on the left side. Pulmonary/Chest: Effort normal and breath sounds normal. No respiratory distress. He exhibits no tenderness. Abdominal: Soft. Normal appearance and bowel sounds are normal. There is no tenderness. There is no rebound and no guarding. Musculoskeletal:        Right lower leg: He exhibits tenderness, bony tenderness, swelling and deformity. Neurological: He is alert and oriented to person, place, and time. He has normal strength. Skin: Skin is warm, dry and intact. No rash noted. No erythema. Psychiatric: He has a normal mood and affect. His speech is normal and behavior is normal. Judgment and thought content normal.   Nursing note and vitals reviewed. Diagnostic Study Results     Radiologic Studies -   XR TIB/FIB RT   Final Result   IMPRESSION: Fractures of tibia and fibula as above. Kevin Dominguez XR ANKLE RT MIN 3 V   Final Result   IMPRESSION: Fractures of tibia and fibula as above. .        Medical Decision Making   I am the first provider for this patient. I reviewed the vital signs, available nursing notes, past medical history, past surgical history, family history and social history. Vital Signs-Reviewed the patient's vital signs. Patient Vitals for the past 12 hrs:   Temp Pulse Resp BP SpO2   09/14/19 1611 97.8 °F (36.6 °C) 71 18 (!) 145/102 99 %       Pulse Oximetry Analysis - 99% on RA    Cardiac Monitor:   Rate: 71 bpm  Rhythm: Normal Sinus Rhythm      Records Reviewed: Nursing Notes, Old Medical Records and Previous Radiology Studies    Provider Notes (Medical Decision Making):   DDx: tibia fracture, fibula fracture, ankle fracture, compartment syndrome    ED Course:   Initial assessment performed. The patients presenting problems have been discussed, and they are in agreement with the care plan formulated and outlined with them.   I have encouraged them to ask questions as they arise throughout their visit. Consult Note:  4:55 PM  Huber Roe MD, spoke with Dr. Tanya Graf,  Specialty: ER at [address] [place] [address]  Discussed pt's hx, disposition, and available diagnostic and imaging results. Reviewed care plans. Consultant accepts pt for transfer. Critical Care Time:   None    Disposition:  Transfer Note:  Patient is being transferred to the ER at [address] [place] [address], transfer accepted by Dr. Tanya Graf. The reasons for patient's transfer have been discussed with the patient and available family. They convey agreement and understanding for the need to be transferred as explained to them by Ledy Rosado MD      PLAN:  1. Transfer to [address] [place] [address] ER  Diagnosis     Clinical Impression:   1. Closed nondisplaced pilon fracture of right tibia, initial encounter        This note is prepared by Jeana Hu, acting as Scribe for MD Huber Ybarra MD: The scribe's documentation has been prepared under my direction and personally reviewed by me in its entirety. I confirm that the note above accurately reflects all work, treatment, procedures, and medical decision making performed by me. This note will not be viewable in [address]

## 2019-09-14 NOTE — ED NOTES
TRANSFER - OUT REPORT:    Verbal report given to Hong Mena RN (name) on Es Campos.  being transferred to [facility] ED (unit) for routine progression of care       Report consisted of patients Situation, Background, Assessment and   Recommendations(SBAR). Information from the following report(s) SBAR, ED Summary, Procedure Summary, MAR, Recent Results and Med Rec Status was reviewed with the receiving nurse. Lines:   Peripheral IV 09/14/19 Right Antecubital (Active)   Site Assessment Clean, dry, & intact 9/14/2019  5:19 PM   Phlebitis Assessment 0 9/14/2019  5:19 PM   Infiltration Assessment 0 9/14/2019  5:19 PM   Dressing Status Clean, dry, & intact 9/14/2019  5:19 PM   Dressing Type Transparent;Tape 9/14/2019  5:19 PM   Hub Color/Line Status Pink;Patent; Flushed 9/14/2019  5:19 PM       Peripheral IV 09/14/19 Left Forearm (Active)   Site Assessment Clean, dry, & intact 9/14/2019  5:28 PM   Phlebitis Assessment 0 9/14/2019  5:28 PM   Infiltration Assessment 0 9/14/2019  5:28 PM   Dressing Status Clean, dry, & intact 9/14/2019  5:28 PM   Dressing Type Transparent 9/14/2019  5:28 PM   Hub Color/Line Status Patent; Flushed;Green 9/14/2019  5:28 PM   Action Taken Blood drawn 9/14/2019  5:28 PM        Opportunity for questions and clarification was provided.       Patient transported with:   [facility]

## 2020-01-13 NOTE — PHYSICIAN ADVISORY
Letter of Status Determination:  
Recommend hospitalization status upgraded from OBSERVATION  to INPATIENT  Status Pt Name:  Saeed Nguyen MR#  
DANIELLE # [id number] / 
[id number] Payor: Lisette Colunga / Plan: [address] / Product Type: Medicare /   
FRANKIE#  [id number] Room and [address] Pepe  @ [address] [place] [address]  
Hospitalization date  9/6/2018  7:00 PM  
Current Attending Physician  Linda Guajardo MD  
Principal diagnosis  Altered mental status Clinicals  70 y.o. y.o  male hospitalized with above diagnosis The pt was noted to have prolonged period of AMS; it was not sure if he was having prolonged post ictal state vs, subclinical seizures. Labs and workup are noted. Appropriate Rx and supportive care are in progress. [place] ([facility]) criteria Does  NOT apply STATUS DETERMINATION  This patient is at high risk of adverse events and deterioration based on documented clinical data, comorbid conditions and current acute care course. Mr. Saeed Nguyen is expected to meet Inpatient Admission status criteria in accordance with CMS regulation Section 43 .3. Specifically, due to medical necessity the patient's stay is expected to exceed Two Midnights. It is our recommendation that this patient's hospitalization status should be upgraded from  OBSERVATION to INPATIENT status. The final decision of the patient's hospitalization status depends on the attending physician's judgment. Additional comments Payor: Lisette Colunga / Plan: [address] / Product Type: Medicare /   
  
 
Giancarlo Castro MD MPH FACP Cell: [phone] Physician Advisor [address] [address] [facility]  
President Medical Staff, [address] [place] [address]   
 Cell  [phone]   
 
 
[phone] Darcy Abernathy Female

## 2020-05-15 DIAGNOSIS — R10.31 SEVERE RIGHT GROIN PAIN: ICD-10-CM

## 2020-05-27 ENCOUNTER — APPOINTMENT (OUTPATIENT)
Dept: GENERAL RADIOLOGY | Age: 65
End: 2020-05-27
Attending: EMERGENCY MEDICINE
Payer: MEDICAID

## 2020-05-27 ENCOUNTER — HOSPITAL ENCOUNTER (EMERGENCY)
Age: 65
Discharge: HOME | End: 2020-05-27
Attending: EMERGENCY MEDICINE
Payer: MEDICAID

## 2020-05-27 VITALS
BODY MASS INDEX: 25.99 KG/M2 | RESPIRATION RATE: 20 BRPM | OXYGEN SATURATION: 99 % | DIASTOLIC BLOOD PRESSURE: 72 MMHG | HEIGHT: 63 IN | WEIGHT: 146.7 LBS | TEMPERATURE: 97.7 F | SYSTOLIC BLOOD PRESSURE: 129 MMHG | HEART RATE: 74 BPM

## 2020-05-27 DIAGNOSIS — S22.41XA CLOSED FRACTURE OF MULTIPLE RIBS OF RIGHT SIDE, INITIAL ENCOUNTER: Primary | ICD-10-CM

## 2020-05-27 PROCEDURE — 71101 X-RAY EXAM UNILAT RIBS/CHEST: CPT

## 2020-05-27 PROCEDURE — 74011250636 HC RX REV CODE- 250/636: Performed by: EMERGENCY MEDICINE

## 2020-05-27 PROCEDURE — 74011250637 HC RX REV CODE- 250/637: Performed by: EMERGENCY MEDICINE

## 2020-05-27 PROCEDURE — 99283 EMERGENCY DEPT VISIT LOW MDM: CPT

## 2020-05-27 PROCEDURE — 74011000250 HC RX REV CODE- 250: Performed by: EMERGENCY MEDICINE

## 2020-05-27 PROCEDURE — 96372 THER/PROPH/DIAG INJ SC/IM: CPT

## 2020-05-27 RX ORDER — PHENOBARBITAL 32.4 MG/1
TABLET ORAL
COMMUNITY
Start: 2019-03-14

## 2020-05-27 RX ORDER — ASPIRIN 81 MG/1
TABLET ORAL
COMMUNITY
Start: 2018-03-05

## 2020-05-27 RX ORDER — NAPROXEN 500 MG/1
500 TABLET ORAL
Qty: 20 TAB | Refills: 0 | Status: SHIPPED | OUTPATIENT
Start: 2020-05-27

## 2020-05-27 RX ORDER — LEVETIRACETAM 750 MG/1
TABLET ORAL
COMMUNITY
Start: 2019-03-14 | End: 2021-02-19

## 2020-05-27 RX ORDER — KETOROLAC TROMETHAMINE 30 MG/ML
30 INJECTION, SOLUTION INTRAMUSCULAR; INTRAVENOUS
Status: COMPLETED | OUTPATIENT
Start: 2020-05-27 | End: 2020-05-27

## 2020-05-27 RX ORDER — LIDOCAINE 4 G/100G
1 PATCH TOPICAL EVERY 24 HOURS
Status: DISCONTINUED | OUTPATIENT
Start: 2020-05-27 | End: 2020-05-27 | Stop reason: HOSPADM

## 2020-05-27 RX ORDER — HYDROCODONE BITARTRATE AND ACETAMINOPHEN 5; 325 MG/1; MG/1
1 TABLET ORAL
Status: COMPLETED | OUTPATIENT
Start: 2020-05-27 | End: 2020-05-27

## 2020-05-27 RX ORDER — SIMVASTATIN 20 MG/1
TABLET, FILM COATED ORAL
COMMUNITY
Start: 2020-05-19

## 2020-05-27 RX ORDER — HYDROCODONE BITARTRATE AND ACETAMINOPHEN 5; 325 MG/1; MG/1
1 TABLET ORAL
Qty: 18 TAB | Refills: 0 | Status: SHIPPED | OUTPATIENT
Start: 2020-05-27 | End: 2020-05-30

## 2020-05-27 RX ORDER — LIDOCAINE 4 G/100G
1 PATCH TOPICAL EVERY 12 HOURS
Qty: 10 PATCH | Refills: 0 | Status: SHIPPED | OUTPATIENT
Start: 2020-05-27

## 2020-05-27 RX ADMIN — KETOROLAC TROMETHAMINE 30 MG: 30 INJECTION, SOLUTION INTRAMUSCULAR; INTRAVENOUS at 14:02

## 2020-05-27 RX ADMIN — HYDROCODONE BITARTRATE AND ACETAMINOPHEN 1 TABLET: 5; 325 TABLET ORAL at 14:54

## 2020-05-27 NOTE — PROGRESS NOTES
Date of previous inpatient admission/ ED visit? 9/14/2019    What brought the patient back to ED? right rib pain after trauma. Patient states that 3 days ago he fell in the shower because he slipped and hit his right chest against the tub. Did patient decline recommended services during last admission/ ED visit (if yes, what)? No    Has patient seen a provider since their last inpatient admission/ED visit (if yes, when)? No     PCP: First and Last name: none listed   Name of Practice:    Are you a current patient: Yes/No:    Approximate date of last visit:    CM Interventions:  From previous inpatient admission/ED visit: follow-up with PCP   From current inpatient admission/ED visit:   Measure patient. CM opened case for assessment of D/C planning needs, CM reviewed chart. Patient lives at [address] [facility]   [phone]. Patient girlfriend Jyothi Le [phone] and [phone]     Discuss DME with patient and provider. Face to face sign and freedom of choice given to patient and place on patient chart. Referral sent to [facility] and referral accepted.     [address] [place] [address]  [phone]

## 2020-05-27 NOTE — DISCHARGE INSTRUCTIONS
Patient Education        Broken Rib: Care Instructions  Your Care Instructions    A broken rib is a crack or break in one of the bones of the rib cage. Breathing can be very painful because the muscles used for breathing pull on the rib. In most cases, a broken rib will heal on its own. You can take pain medicine while the rib mends. Pain relief allows you to take deep breaths. In the past, doctors recommended taping or wrapping broken ribs. This is no longer done because taping makes it hard for you to take deep breaths. Taking deep breaths may help prevent pneumonia or a partial collapse of a lung. Your rib will heal in about 6 weeks. You heal best when you take good care of yourself. Eat a variety of healthy foods, and don't smoke. Follow-up care is a key part of your treatment and safety. Be sure to make and go to all appointments, and call your doctor if you are having problems. It's also a good idea to know your test results and keep a list of the medicines you take. How can you care for yourself at home? · Be safe with medicines. Read and follow all instructions on the label. ? If the doctor gave you a prescription medicine for pain, take it as prescribed. ? If you are not taking a prescription pain medicine, ask your doctor if you can take an over-the-counter medicine. · Even if it hurts, try to cough or take the deepest breath you can at least once every hour. This will get air deeply into your lungs. This may reduce your chance of getting pneumonia or a partial collapse of a lung. Hold a pillow against your chest to make this less painful. · Put ice or a cold pack on the area for 10 to 20 minutes at a time. Put a thin cloth between the ice and your skin. When should you call for help? Call 911 anytime you think you may need emergency care.  For example, call if:    · You have severe trouble breathing.    Call your doctor now or seek immediate medical care if:    · You have some trouble breathing.     · You have a fever.     · You have a new or worse cough.    Watch closely for changes in your health, and be sure to contact your doctor if:    · You have pain even after taking your medicine.     · You do not get better as expected. Where can you learn more? Go to http://sanjiv-maria m.info/  Enter M135 in the search box to learn more about \"Broken Rib: Care Instructions. \"  Current as of: June 26, 2019Content Version: 12.4  © 0009-7397 [employer]. Care instructions adapted under license by [employer] (which disclaims liability or warranty for this information). If you have questions about a medical condition or this instruction, always ask your healthcare professional. [address] any warranty or liability for your use of this information.

## 2020-05-27 NOTE — ED PROVIDER NOTES
EMERGENCY DEPARTMENT HISTORY AND PHYSICAL EXAM      Date: 5/27/2020  Patient Name: Anjelica Carpenter Patient Age and Sex: 77 y.o. male     History of Presenting Illness     Chief Complaint   Patient presents with    Rib Injury       History Provided By: Patient    HPI: Anjelica Carpenter is a 52-year-old male who presents with right rib pain after trauma. Patient states that 3 days ago he fell in the shower because he slipped and hit his right chest against the tub. Patient states that he has been applying a cream on it but he is gotten to the point where it is not helping. Has not taken any over-the-counter medications such as Tylenol or ibuprofen. Patient states that the pain is a 10 out of 10 and is over his right chest wall as well as right side. Has been having some shortness of breath because he cannot take a deep breath in. Patient states that he has from the shower 4 times in the past.    There are no other complaints, changes, or physical findings at this time. PCP: Other, MD Nitish    No current facility-administered medications on file prior to encounter. Current Outpatient Medications on File Prior to Encounter   Medication Sig Dispense Refill    aspirin delayed-release 81 mg tablet 81 mg = 1 tab each dose, PO, daily, # 30 tab, 1 Refills, Pharmacy: [facility]      levETIRAcetam (KEPPRA) 750 mg tablet 750 mg = 1 tab each dose, PO, bid, # 180 tab, 3 Refills, Pharmacy: [facility]      PHENobarbitaL (LUMINAL) 32.4 mg tablet See Instructions, 32.4 mg (1 tab) in the morning  64.8 mg (2 tabs) at bedtime  Fax to Renata, # 270 tab, 3 Refills, Pharmacy: [facility]      simvastatin (ZOCOR) 20 mg tablet       ibuprofen (MOTRIN) 800 mg tablet Take 1 Tab by mouth every eight (8) hours as needed for Pain. 20 Tab 0    divalproex DR (DEPAKOTE) 500 mg tablet Take 1 Tab by mouth two (2) times a day.  60 Tab 2    hydrOXYzine HCl (ATARAX) 50 mg tablet Take 50 mg by mouth nightly.  neomycin-bacitracin-polymyxin (TRIPLE ANTIBIOTIC) 3.5mg-400 unit- 5,000 unit/gram ointment Apply  to affected area two (2) times a day. 9 g 1       Past History     Past Medical History:  Past Medical History:   Diagnosis Date    High cholesterol     HTN (hypertension)     Leg swelling     Memory disorder     Migraine     1987    Seizure ([address]     Seizures ([address]     30 years    Visual disturbance        Past Surgical History:  Past Surgical History:   Procedure Laterality Date    HX OTHER SURGICAL      skull plating- 24       Family History:  Family History   Problem Relation Age of Onset    Heart Disease Mother     Seizures Father     Headache Sister     Heart Disease Sister        Social History:  Social History     Tobacco Use    Smoking status: Never Smoker    Smokeless tobacco: Never Used   Substance Use Topics    Alcohol use: No    Drug use: No       Allergies:  No Known Allergies      Review of Systems   Review of Systems   Constitutional: Negative for chills and fever. Respiratory: Positive for shortness of breath. Negative for cough. Cardiovascular: Positive for chest pain. Gastrointestinal: Negative for abdominal pain, constipation, diarrhea, nausea and vomiting. Genitourinary: Negative for dysuria, frequency and hematuria. Neurological: Negative for weakness and numbness. All other systems reviewed and are negative. Physical Exam   Physical Exam  Constitutional:       General: He is in acute distress. Appearance: He is well-developed. Comments: does appear uncomfortable   HENT:      Head: Normocephalic and atraumatic. Neck:      Musculoskeletal: Normal range of motion. Cardiovascular:      Rate and Rhythm: Normal rate. Comments: Well perfused. Patient is tender over his right anterior upper and lower chest.  He is also tender over his right side of chest wall underneath the right axilla.   Pulmonary:      Effort: Pulmonary effort is normal. No respiratory distress. Abdominal:      General: Abdomen is flat. There is no distension. Palpations: Abdomen is soft. Tenderness: There is no abdominal tenderness. Musculoskeletal: Normal range of motion. Neurological:      General: No focal deficit present. Mental Status: He is alert and oriented to person, place, and time. Psychiatric:         Mood and Affect: Mood normal.          Diagnostic Study Results     Labs -   No results found for this or any previous visit (from the past 12 hour(s)). Radiologic Studies -   XR RIBS RT W PA CXR MIN 3 V   Final Result   IMPRESSION:   1. Acute nondisplaced fractures of the right sixth through ninth ribs. 2. Small right pleural effusion, but no pneumothorax              CT Results  (Last 48 hours)    None        CXR Results  (Last 48 hours)    None            Medical Decision Making   I am the first provider for this patient. I reviewed the vital signs, available nursing notes, past medical history, past surgical history, family history and social history. Vital Signs-Reviewed the patient's vital signs. Patient Vitals for the past 12 hrs:   Temp Pulse Resp BP SpO2   05/27/20 1344 97.7 °F (36.5 °C) 74 20 129/72 99 %       Records Reviewed: Nursing Notes and Old Medical Records    Provider Notes (Medical Decision Making):   Patient presenting with right sided rib pain and chest pain after trauma. Will get x-rays and analgesics to differentiate between contusion, fracture, sprain. Patient does have a history of falls in the bathtub so counseled on the importance of getting a seat for the shower and some handrails    ED Course:   Initial assessment performed. The patients presenting problems have been discussed, and they are in agreement with the care plan formulated and outlined with them. I have encouraged them to ask questions as they arise throughout their visit.        Critical Care Time:   0    Disposition:  Discharge Note:  The patient has been re-evaluated and is ready for discharge. Reviewed available results with patient. Counseled patient on diagnosis and care plan. Patient has expressed understanding, and all questions have been answered. Patient agrees with plan and agrees to follow up as recommended, or to return to the ED if their symptoms worsen. Discharge instructions have been provided and explained to the patient, along with reasons to return to the ED. PLAN:  Current Discharge Medication List      START taking these medications    Details   HYDROcodone-acetaminophen (Norco) 5-325 mg per tablet Take 1 Tab by mouth every four (4) hours as needed for Pain for up to 3 days. Max Daily Amount: 6 Tabs. Qty: 18 Tab, Refills: 0    Associated Diagnoses: Closed fracture of multiple ribs of right side, initial encounter      lidocaine 4 % patch 1 Patch by TransDERmal route every twelve (12) hours every twelve (12) hours. Qty: 10 Patch, Refills: 0      naproxen (NAPROSYN) 500 mg tablet Take 1 Tab by mouth every twelve (12) hours as needed for Pain. Qty: 20 Tab, Refills: 0           2. Follow-up Information     Follow up With Specialties Details Why [address] [facility] EMERGENCY DEPT Emergency Medicine  If symptoms worsen [address]        3. Return to ED if worse     Diagnosis     Clinical Impression:   1. Closed fracture of multiple ribs of right side, initial encounter        Attestations:    Martinez Donald M.D. Please note that this dictation was completed with [employer], the computer voice recognition software. Quite often unanticipated grammatical, syntax, homophones, and other interpretive errors are inadvertently transcribed by the computer software. Please disregard these errors. Please excuse any errors that have escaped final proofreading. Thank you.

## 2021-04-22 ENCOUNTER — APPOINTMENT (OUTPATIENT)
Dept: CT IMAGING | Age: 66
End: 2021-04-22
Attending: EMERGENCY MEDICINE
Payer: COMMERCIAL

## 2021-04-22 ENCOUNTER — APPOINTMENT (OUTPATIENT)
Dept: GENERAL RADIOLOGY | Age: 66
End: 2021-04-22
Attending: EMERGENCY MEDICINE
Payer: COMMERCIAL

## 2021-04-22 ENCOUNTER — HOSPITAL ENCOUNTER (EMERGENCY)
Age: 66
Discharge: HOME | End: 2021-04-23
Attending: EMERGENCY MEDICINE
Payer: COMMERCIAL

## 2021-04-22 DIAGNOSIS — S05.01XA ABRASION OF RIGHT CORNEA, INITIAL ENCOUNTER: ICD-10-CM

## 2021-04-22 DIAGNOSIS — S30.0XXA CONTUSION OF LOWER BACK, INITIAL ENCOUNTER: ICD-10-CM

## 2021-04-22 DIAGNOSIS — S30.810A ABRASION OF LOWER BACK, INITIAL ENCOUNTER: Primary | ICD-10-CM

## 2021-04-22 DIAGNOSIS — S13.9XXA NECK SPRAIN, INITIAL ENCOUNTER: ICD-10-CM

## 2021-04-22 DIAGNOSIS — S83.91XA SPRAIN OF RIGHT KNEE, UNSPECIFIED LIGAMENT, INITIAL ENCOUNTER: ICD-10-CM

## 2021-04-22 PROCEDURE — 75810000275 HC EMERGENCY DEPT VISIT NO LEVEL OF CARE

## 2021-04-22 PROCEDURE — 72125 CT NECK SPINE W/O DYE: CPT

## 2021-04-22 PROCEDURE — 74011000250 HC RX REV CODE- 250: Performed by: EMERGENCY MEDICINE

## 2021-04-22 PROCEDURE — 73562 X-RAY EXAM OF KNEE 3: CPT

## 2021-04-22 PROCEDURE — 72131 CT LUMBAR SPINE W/O DYE: CPT

## 2021-04-22 PROCEDURE — 99284 EMERGENCY DEPT VISIT MOD MDM: CPT

## 2021-04-22 PROCEDURE — 70450 CT HEAD/BRAIN W/O DYE: CPT

## 2021-04-22 PROCEDURE — 74011250637 HC RX REV CODE- 250/637: Performed by: EMERGENCY MEDICINE

## 2021-04-22 RX ORDER — IBUPROFEN 600 MG/1
600 TABLET ORAL
Status: COMPLETED | OUTPATIENT
Start: 2021-04-22 | End: 2021-04-22

## 2021-04-22 RX ORDER — LIDOCAINE 4 G/100G
1 PATCH TOPICAL EVERY 24 HOURS
Status: DISCONTINUED | OUTPATIENT
Start: 2021-04-22 | End: 2021-04-23 | Stop reason: HOSPADM

## 2021-04-22 RX ORDER — POLYMYXIN B SULFATE AND TRIMETHOPRIM 1; 10000 MG/ML; [USP'U]/ML
1 SOLUTION OPHTHALMIC EVERY 4 HOURS
Qty: 10 ML | Refills: 0 | Status: SHIPPED | OUTPATIENT
Start: 2021-04-22

## 2021-04-22 RX ORDER — TETRACAINE HYDROCHLORIDE 5 MG/ML
1 SOLUTION OPHTHALMIC
Status: COMPLETED | OUTPATIENT
Start: 2021-04-22 | End: 2021-04-22

## 2021-04-22 RX ORDER — OXYCODONE AND ACETAMINOPHEN 5; 325 MG/1; MG/1
2 TABLET ORAL
Status: COMPLETED | OUTPATIENT
Start: 2021-04-22 | End: 2021-04-22

## 2021-04-22 RX ADMIN — TETRACAINE HYDROCHLORIDE 1 DROP: 5 SOLUTION OPHTHALMIC at 23:19

## 2021-04-22 RX ADMIN — IBUPROFEN 600 MG: 600 TABLET ORAL at 21:09

## 2021-04-22 RX ADMIN — FLUORESCEIN SODIUM 1 STRIP: 0.6 STRIP OPHTHALMIC at 23:19

## 2021-04-22 RX ADMIN — OXYCODONE AND ACETAMINOPHEN 2 TABLET: 5; 325 TABLET ORAL at 23:19
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
## 2021-04-23 VITALS
BODY MASS INDEX: 23.92 KG/M2 | HEART RATE: 70 BPM | OXYGEN SATURATION: 99 % | TEMPERATURE: 98.2 F | RESPIRATION RATE: 16 BRPM | HEIGHT: 62 IN | WEIGHT: 130 LBS | DIASTOLIC BLOOD PRESSURE: 84 MMHG | SYSTOLIC BLOOD PRESSURE: 137 MMHG

## 2021-04-23 RX ORDER — OXYCODONE AND ACETAMINOPHEN 5; 325 MG/1; MG/1
1 TABLET ORAL
Qty: 10 TAB | Refills: 0 | Status: SHIPPED | OUTPATIENT
Start: 2021-04-23 | End: 2021-04-26

## 2021-04-23 RX ORDER — METHOCARBAMOL 750 MG/1
750 TABLET, FILM COATED ORAL
Qty: 15 TAB | Refills: 0 | Status: SHIPPED | OUTPATIENT
Start: 2021-04-23

## 2021-04-23 RX ORDER — LIDOCAINE 4 G/100G
PATCH TOPICAL
Qty: 30 PATCH | Refills: 0 | Status: SHIPPED | OUTPATIENT
Start: 2021-04-23

## 2021-04-23 NOTE — ED NOTES
Patient has been instructed that they have been given oxycodone which contains opioids, benzodiazepines, or other sedating drugs. Patient is aware that they  will need to refrain from driving or operating heavy machinery after taking this medication. Patient also instructed that they need to avoid drinking alcohol and using other products containing opioids, benzodiazepines, or other sedating drugs. Patient verbalized understanding.
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
## 2021-04-23 NOTE — ED NOTES
Patient  given copy of dc instructions and 3 script(s). Patient  verbalized understanding of instructions and script (s). Patient given a current medication reconciliation form and verbalized understanding of their medications. Patient  verbalized understanding of the importance of discussing medications with  his or her physician or clinic they will be following up with. Patient alert and oriented and in no acute distress. Patient discharged home with sister driving.
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
## 2021-06-14 ENCOUNTER — TRANSCRIBE ORDER (OUTPATIENT)
Dept: SCHEDULING | Age: 66
End: 2021-06-14

## 2021-06-14 DIAGNOSIS — C61 MALIGNANT NEOPLASM OF PROSTATE (HCC): Primary | ICD-10-CM

## 2021-06-22 ENCOUNTER — HOSPITAL ENCOUNTER (OUTPATIENT)
Dept: NUCLEAR MEDICINE | Age: 66
Discharge: HOME | End: 2021-06-22
Attending: UROLOGY
Payer: MEDICARE

## 2021-06-22 ENCOUNTER — HOSPITAL ENCOUNTER (OUTPATIENT)
Dept: CT IMAGING | Age: 66
Discharge: HOME | End: 2021-06-22
Attending: UROLOGY
Payer: MEDICARE

## 2021-06-22 DIAGNOSIS — C61 MALIGNANT NEOPLASM OF PROSTATE (HCC): ICD-10-CM

## 2021-06-22 LAB — CREAT BLD-MCNC: 0.7 MG/DL (ref 0.6–1.3)

## 2021-06-22 PROCEDURE — 78306 BONE IMAGING WHOLE BODY: CPT

## 2021-06-22 PROCEDURE — 74011000636 HC RX REV CODE- 636: Performed by: UROLOGY

## 2021-06-22 PROCEDURE — 82565 ASSAY OF CREATININE: CPT

## 2021-06-22 PROCEDURE — 74177 CT ABD & PELVIS W/CONTRAST: CPT

## 2021-06-22 RX ADMIN — IOHEXOL 50 ML: 240 INJECTION, SOLUTION INTRATHECAL; INTRAVASCULAR; INTRAVENOUS; ORAL at 12:17

## 2021-06-22 RX ADMIN — IOPAMIDOL 100 ML: 755 INJECTION, SOLUTION INTRAVENOUS at 12:17

## 2021-07-23 ENCOUNTER — HOSPITAL ENCOUNTER (OUTPATIENT)
Dept: RADIATION THERAPY | Age: 66
Discharge: HOME | End: 2021-07-23

## 2021-09-08 ENCOUNTER — HOSPITAL ENCOUNTER (OUTPATIENT)
Dept: RADIATION THERAPY | Age: 66
Discharge: HOME | End: 2021-09-08

## 2021-09-09 ENCOUNTER — HOSPITAL ENCOUNTER (OUTPATIENT)
Dept: RADIATION THERAPY | Age: 66
Discharge: HOME | End: 2021-09-09

## 2021-09-10 ENCOUNTER — HOSPITAL ENCOUNTER (OUTPATIENT)
Dept: RADIATION THERAPY | Age: 66
Discharge: HOME | End: 2021-09-10

## 2021-09-13 ENCOUNTER — HOSPITAL ENCOUNTER (OUTPATIENT)
Dept: RADIATION THERAPY | Age: 66
Discharge: HOME | End: 2021-09-13

## 2021-09-14 ENCOUNTER — HOSPITAL ENCOUNTER (OUTPATIENT)
Dept: RADIATION THERAPY | Age: 66
Discharge: HOME | End: 2021-09-14

## 2021-09-15 ENCOUNTER — HOSPITAL ENCOUNTER (OUTPATIENT)
Dept: RADIATION THERAPY | Age: 66
Discharge: HOME | End: 2021-09-15

## 2021-09-16 ENCOUNTER — HOSPITAL ENCOUNTER (OUTPATIENT)
Dept: RADIATION THERAPY | Age: 66
Discharge: HOME | End: 2021-09-16

## 2021-09-17 ENCOUNTER — HOSPITAL ENCOUNTER (OUTPATIENT)
Dept: RADIATION THERAPY | Age: 66
Discharge: HOME | End: 2021-09-17

## 2021-09-20 ENCOUNTER — HOSPITAL ENCOUNTER (OUTPATIENT)
Dept: RADIATION THERAPY | Age: 66
Discharge: HOME | End: 2021-09-20

## 2021-09-21 ENCOUNTER — HOSPITAL ENCOUNTER (OUTPATIENT)
Dept: RADIATION THERAPY | Age: 66
Discharge: HOME | End: 2021-09-21

## 2021-09-22 ENCOUNTER — HOSPITAL ENCOUNTER (OUTPATIENT)
Dept: RADIATION THERAPY | Age: 66
Discharge: HOME | End: 2021-09-22

## 2021-09-23 ENCOUNTER — HOSPITAL ENCOUNTER (OUTPATIENT)
Dept: RADIATION THERAPY | Age: 66
Discharge: HOME | End: 2021-09-23

## 2021-09-24 ENCOUNTER — HOSPITAL ENCOUNTER (OUTPATIENT)
Dept: RADIATION THERAPY | Age: 66
Discharge: HOME | End: 2021-09-24

## 2021-09-24 RX ORDER — TAMSULOSIN HYDROCHLORIDE 0.4 MG/1
0.4 CAPSULE ORAL
Qty: 90 CAPSULE | Refills: 5 | Status: SHIPPED | OUTPATIENT
Start: 2021-09-24

## 2021-09-27 ENCOUNTER — HOSPITAL ENCOUNTER (OUTPATIENT)
Dept: RADIATION THERAPY | Age: 66
Discharge: HOME | End: 2021-09-27

## 2021-09-28 ENCOUNTER — HOSPITAL ENCOUNTER (OUTPATIENT)
Dept: RADIATION THERAPY | Age: 66
Discharge: HOME | End: 2021-09-28

## 2021-09-30 ENCOUNTER — HOSPITAL ENCOUNTER (OUTPATIENT)
Dept: RADIATION THERAPY | Age: 66
Discharge: HOME | End: 2021-09-30

## 2021-10-01 ENCOUNTER — HOSPITAL ENCOUNTER (OUTPATIENT)
Dept: RADIATION THERAPY | Age: 66
Discharge: HOME | End: 2021-10-01

## 2021-10-04 ENCOUNTER — HOSPITAL ENCOUNTER (OUTPATIENT)
Dept: RADIATION THERAPY | Age: 66
Discharge: HOME | End: 2021-10-04

## 2021-10-06 ENCOUNTER — HOSPITAL ENCOUNTER (OUTPATIENT)
Dept: RADIATION THERAPY | Age: 66
Discharge: HOME | End: 2021-10-06

## 2021-10-07 ENCOUNTER — HOSPITAL ENCOUNTER (OUTPATIENT)
Dept: RADIATION THERAPY | Age: 66
Discharge: HOME | End: 2021-10-07

## 2021-10-08 ENCOUNTER — HOSPITAL ENCOUNTER (OUTPATIENT)
Dept: RADIATION THERAPY | Age: 66
Discharge: HOME | End: 2021-10-08

## 2021-10-10 ENCOUNTER — HOSPITAL ENCOUNTER (EMERGENCY)
Age: 66
Discharge: HOME | End: 2021-10-10
Attending: EMERGENCY MEDICINE
Payer: MEDICARE

## 2021-10-10 ENCOUNTER — APPOINTMENT (OUTPATIENT)
Dept: CT IMAGING | Age: 66
End: 2021-10-10
Attending: EMERGENCY MEDICINE
Payer: MEDICARE

## 2021-10-10 VITALS
TEMPERATURE: 98.6 F | OXYGEN SATURATION: 94 % | SYSTOLIC BLOOD PRESSURE: 128 MMHG | DIASTOLIC BLOOD PRESSURE: 74 MMHG | RESPIRATION RATE: 16 BRPM | HEART RATE: 81 BPM | WEIGHT: 150 LBS

## 2021-10-10 DIAGNOSIS — G40.919 BREAKTHROUGH SEIZURE (HCC): Primary | ICD-10-CM

## 2021-10-10 LAB
ALBUMIN SERPL-MCNC: 3.3 G/DL (ref 3.5–5)
ALBUMIN/GLOB SERPL: 0.9 {RATIO} (ref 1.1–2.2)
ALP SERPL-CCNC: 132 U/L (ref 45–117)
ALT SERPL-CCNC: 27 U/L (ref 12–78)
ANION GAP SERPL CALC-SCNC: 9 MMOL/L (ref 5–15)
AST SERPL-CCNC: 29 U/L (ref 15–37)
BASOPHILS # BLD: 0 K/UL (ref 0–0.1)
BASOPHILS NFR BLD: 0 % (ref 0–1)
BILIRUB SERPL-MCNC: 0.2 MG/DL (ref 0.2–1)
BUN SERPL-MCNC: 17 MG/DL (ref 6–20)
BUN/CREAT SERPL: 13 (ref 12–20)
CALCIUM SERPL-MCNC: 9.5 MG/DL (ref 8.5–10.1)
CHLORIDE SERPL-SCNC: 103 MMOL/L (ref 97–108)
CO2 SERPL-SCNC: 29 MMOL/L (ref 21–32)
CREAT SERPL-MCNC: 1.28 MG/DL (ref 0.7–1.3)
DIFFERENTIAL METHOD BLD: ABNORMAL
EOSINOPHIL # BLD: 0.1 K/UL (ref 0–0.4)
EOSINOPHIL NFR BLD: 1 % (ref 0–7)
ERYTHROCYTE [DISTWIDTH] IN BLOOD BY AUTOMATED COUNT: 12.8 % (ref 11.5–14.5)
ETHANOL SERPL-MCNC: <10 MG/DL
GLOBULIN SER CALC-MCNC: 3.8 G/DL (ref 2–4)
GLUCOSE BLD STRIP.AUTO-MCNC: 105 MG/DL (ref 65–117)
GLUCOSE SERPL-MCNC: 134 MG/DL (ref 65–100)
HCT VFR BLD AUTO: 35.6 % (ref 36.6–50.3)
HGB BLD-MCNC: 11.5 G/DL (ref 12.1–17)
IMM GRANULOCYTES # BLD AUTO: 0 K/UL (ref 0–0.04)
IMM GRANULOCYTES NFR BLD AUTO: 0 % (ref 0–0.5)
LYMPHOCYTES # BLD: 0.7 K/UL (ref 0.8–3.5)
LYMPHOCYTES NFR BLD: 14 % (ref 12–49)
MCH RBC QN AUTO: 28.3 PG (ref 26–34)
MCHC RBC AUTO-ENTMCNC: 32.3 G/DL (ref 30–36.5)
MCV RBC AUTO: 87.5 FL (ref 80–99)
MONOCYTES # BLD: 0.5 K/UL (ref 0–1)
MONOCYTES NFR BLD: 11 % (ref 5–13)
NEUTS SEG # BLD: 3.6 K/UL (ref 1.8–8)
NEUTS SEG NFR BLD: 74 % (ref 32–75)
NRBC # BLD: 0 K/UL (ref 0–0.01)
NRBC BLD-RTO: 0 PER 100 WBC
PHENOBARB SERPL-MCNC: 12.4 UG/ML (ref 15–40)
PLATELET # BLD AUTO: 156 K/UL (ref 150–400)
PMV BLD AUTO: 9.7 FL (ref 8.9–12.9)
POTASSIUM SERPL-SCNC: 3.8 MMOL/L (ref 3.5–5.1)
PROT SERPL-MCNC: 7.1 G/DL (ref 6.4–8.2)
RBC # BLD AUTO: 4.07 M/UL (ref 4.1–5.7)
RBC MORPH BLD: ABNORMAL
SERVICE CMNT-IMP: NORMAL
SODIUM SERPL-SCNC: 141 MMOL/L (ref 136–145)
WBC # BLD AUTO: 4.9 K/UL (ref 4.1–11.1)

## 2021-10-10 PROCEDURE — 82962 GLUCOSE BLOOD TEST: CPT

## 2021-10-10 PROCEDURE — 96365 THER/PROPH/DIAG IV INF INIT: CPT

## 2021-10-10 PROCEDURE — 85025 COMPLETE CBC W/AUTO DIFF WBC: CPT

## 2021-10-10 PROCEDURE — 82077 ASSAY SPEC XCP UR&BREATH IA: CPT

## 2021-10-10 PROCEDURE — 99284 EMERGENCY DEPT VISIT MOD MDM: CPT

## 2021-10-10 PROCEDURE — 80184 ASSAY OF PHENOBARBITAL: CPT

## 2021-10-10 PROCEDURE — 96366 THER/PROPH/DIAG IV INF ADDON: CPT

## 2021-10-10 PROCEDURE — 70450 CT HEAD/BRAIN W/O DYE: CPT

## 2021-10-10 PROCEDURE — 93005 ELECTROCARDIOGRAM TRACING: CPT

## 2021-10-10 PROCEDURE — 74011000258 HC RX REV CODE- 258: Performed by: EMERGENCY MEDICINE

## 2021-10-10 PROCEDURE — 80053 COMPREHEN METABOLIC PANEL: CPT

## 2021-10-10 PROCEDURE — 36415 COLL VENOUS BLD VENIPUNCTURE: CPT

## 2021-10-10 PROCEDURE — 74011250636 HC RX REV CODE- 250/636: Performed by: EMERGENCY MEDICINE

## 2021-10-10 RX ORDER — SODIUM CHLORIDE 0.9 % (FLUSH) 0.9 %
5-40 SYRINGE (ML) INJECTION AS NEEDED
Status: DISCONTINUED | OUTPATIENT
Start: 2021-10-10 | End: 2021-10-10

## 2021-10-10 RX ORDER — SODIUM CHLORIDE 0.9 % (FLUSH) 0.9 %
5-40 SYRINGE (ML) INJECTION EVERY 8 HOURS
Status: DISCONTINUED | OUTPATIENT
Start: 2021-10-10 | End: 2021-10-11 | Stop reason: HOSPADM

## 2021-10-10 RX ADMIN — PHENYTOIN SODIUM 1000 MG: 50 INJECTION INTRAMUSCULAR; INTRAVENOUS at 20:19

## 2021-10-10 NOTE — ED TRIAGE NOTES
Reports witnessed seizure today. Post ictal at present. Last seizure was at least a year ago. Reports compliance with medications. No drug or etoh, per pt.

## 2021-10-10 NOTE — ED PROVIDER NOTES
EMERGENCY DEPARTMENT HISTORY AND PHYSICAL EXAM      Please note that this dictation was completed with the assistance of \"Dragon\", the computer voice recognition software. Quite often unanticipated grammatical, syntax, homophones, and other interpretive errors are inadvertently transcribed by the computer software. Please disregard these errors and any errors that have escaped final proofreading. Thank you. Patient: Jamey Bravo  DOS: 10/10/21  : 1955  MRN: [id number]  History of Presenting Illness     Chief Complaint   Patient presents with    Seizure     History Provided By: Patient/family/EMS (if applicable)    HPI: Jamey Bravo, 74 y.o. male with past medical history as documented below presents to the ED with c/o of witnessed seizure prior to arrival.  Patient states that his last seizure was approximately 2 years ago. Patient does take phenobarbital as well as Dilantin. Pt states he is currently on Phenobarbital 400mg BID and Dilantin 600mg BID. Patient reports compliance of medications. Patient denies any pain. Patient states that he was at home when the seizure occurred. He denies any numbness, weakness, blurry vision. He does admit to decreased p.o. intake the past several days. Denies any sickness or weaknesses. Denies any recent sick contacts. Pt denies any other exacerbating or ameliorating factors. Additionally, pt specifically denies any recent fever, chills, headache, nausea, vomiting, abdominal pain, CP, SOB, lightheadedness, dizziness, numbness, weakness, lower extremity swelling, heart palpitations, urinary sxs, diarrhea, constipation, melena, hematochezia, cough, or congestion. There are no other complaints, changes or physical findings pertinent to the HPI at this time. PCP: Franklyn, MD Nitish  Past History   Past Medical History:  Seizures    Past Surgical History:  History reviewed. No pertinent surgical history.     Family History:   Family history reviewed and was non-contributory, unless specified below:  History reviewed. No pertinent family history. Social History:  Social History     Tobacco Use    Smoking status: Never Smoker    Smokeless tobacco: Never Used   Substance Use Topics    Alcohol use: Not Currently    Drug use: Not on file       Allergies:  No Known Allergies    Current Medications:  No current facility-administered medications on file prior to encounter. No current outpatient medications on file prior to encounter. Review of Systems   A complete ROS was reviewed by me today and all other systems negative, unless otherwise specified below:  Review of Systems   Constitutional: Negative. Negative for chills and fever. HENT: Negative. Negative for congestion and sore throat. Eyes: Negative. Respiratory: Negative. Negative for cough, chest tightness, shortness of breath and wheezing. Cardiovascular: Negative. Negative for chest pain, palpitations and leg swelling. Gastrointestinal: Negative. Negative for abdominal distention, abdominal pain, blood in stool, constipation, diarrhea, nausea and vomiting. Endocrine: Negative. Genitourinary: Negative. Negative for dysuria, flank pain, frequency, hematuria and urgency. Musculoskeletal: Negative. Negative for arthralgias, back pain and myalgias. Skin: Negative. Negative for color change and rash. Neurological: Positive for seizures. Negative for dizziness, syncope, speech difficulty, weakness, light-headedness, numbness and headaches. Hematological: Negative. Psychiatric/Behavioral: Negative. Negative for confusion and self-injury. The patient is not nervous/anxious. All other systems reviewed and are negative. Physical Exam   Physical Exam  Vitals and nursing note reviewed. Constitutional:       Appearance: He is well-developed. He is not toxic-appearing. HENT:      Head: Normocephalic and atraumatic.       Comments: Tongue ecchymosis on the lateral aspect on the right, no active bleeding. Mouth/Throat:      Pharynx: No posterior oropharyngeal erythema. Eyes:      Conjunctiva/sclera: Conjunctivae normal.   Cardiovascular:      Rate and Rhythm: Normal rate and regular rhythm. Heart sounds: Normal heart sounds. No murmur heard. No friction rub. No gallop. Pulmonary:      Effort: Pulmonary effort is normal. No respiratory distress. Breath sounds: Normal breath sounds. No wheezing or rales. Chest:      Chest wall: No tenderness. Abdominal:      General: Bowel sounds are normal. There is no distension. Palpations: Abdomen is soft. There is no mass. Tenderness: There is no abdominal tenderness. There is no guarding or rebound. Musculoskeletal:         General: Normal range of motion. Cervical back: Normal range of motion. Skin:     General: Skin is warm. Neurological:      General: No focal deficit present. Mental Status: He is alert and oriented to person, place, and time. Motor: No abnormal muscle tone. Psychiatric:         Behavior: Behavior is cooperative. Diagnostic Study Results     Laboratory Data  I have personally reviewed and interpreted all available laboratory results. Recent Results (from the past 24 hour(s))   CBC WITH AUTOMATED DIFF    Collection Time: 10/10/21  7:11 PM   Result Value Ref Range    WBC 4.9 4.1 - 11.1 K/uL    RBC 4.07 (L) 4.10 - 5.70 M/uL    HGB 11.5 (L) 12.1 - 17.0 g/dL    HCT 35.6 (L) 36.6 - 50.3 %    MCV 87.5 80.0 - 99.0 FL    MCH 28.3 26.0 - 34.0 PG    MCHC 32.3 30.0 - 36.5 g/dL    RDW 12.8 11.5 - 14.5 %    PLATELET 152 823 - 564 K/uL    MPV 9.7 8.9 - 12.9 FL    NRBC 0.0 0  WBC    ABSOLUTE NRBC 0.00 0.00 - 0.01 K/uL    NEUTROPHILS 74 32 - 75 %    LYMPHOCYTES 14 12 - 49 %    MONOCYTES 11 5 - 13 %    EOSINOPHILS 1 0 - 7 %    BASOPHILS 0 0 - 1 %    IMMATURE GRANULOCYTES 0 0.0 - 0.5 %    ABS. NEUTROPHILS 3.6 1.8 - 8.0 K/UL    ABS. LYMPHOCYTES 0.7 (L) 0.8 - 3.5 K/UL    ABS. MONOCYTES 0.5 0.0 - 1.0 K/UL    ABS. EOSINOPHILS 0.1 0.0 - 0.4 K/UL    ABS. BASOPHILS 0.0 0.0 - 0.1 K/UL    ABS. IMM. GRANS. 0.0 0.00 - 0.04 K/UL    DF SMEAR SCANNED      RBC COMMENTS NORMOCYTIC, NORMOCHROMIC     METABOLIC PANEL, COMPREHENSIVE    Collection Time: 10/10/21  7:11 PM   Result Value Ref Range    Sodium 141 136 - 145 mmol/L    Potassium 3.8 3.5 - 5.1 mmol/L    Chloride 103 97 - 108 mmol/L    CO2 29 21 - 32 mmol/L    Anion gap 9 5 - 15 mmol/L    Glucose 134 (H) 65 - 100 mg/dL    BUN 17 6 - 20 MG/DL    Creatinine 1.28 0.70 - 1.30 MG/DL    BUN/Creatinine ratio 13 12 - 20      GFR est AA >60 >60 ml/min/1.73m2    GFR est non-AA 56 (L) >60 ml/min/1.73m2    Calcium 9.5 8.5 - 10.1 MG/DL    Bilirubin, total 0.2 0.2 - 1.0 MG/DL    ALT (SGPT) 27 12 - 78 U/L    AST (SGOT) 29 15 - 37 U/L    Alk. phosphatase 132 (H) 45 - 117 U/L    Protein, total 7.1 6.4 - 8.2 g/dL    Albumin 3.3 (L) 3.5 - 5.0 g/dL    Globulin 3.8 2.0 - 4.0 g/dL    A-G Ratio 0.9 (L) 1.1 - 2.2     ETHYL ALCOHOL    Collection Time: 10/10/21  7:11 PM   Result Value Ref Range    ALCOHOL(ETHYL),SERUM <10 <10 MG/DL   EKG, 12 LEAD, INITIAL    Collection Time: 10/10/21  7:57 PM   Result Value Ref Range    Ventricular Rate 54 BPM    Atrial Rate 54 BPM    P-R Interval 132 ms    QRS Duration 82 ms    Q-T Interval 438 ms    QTC Calculation (Bezet) 415 ms    Calculated P Axis 43 degrees    Calculated R Axis 5 degrees    Calculated T Axis 18 degrees    Diagnosis       Sinus bradycardia  Otherwise normal ECG  No previous ECGs available     GLUCOSE, POC    Collection Time: 10/10/21  8:00 PM   Result Value Ref Range    Glucose (POC) 105 65 - 117 mg/dL    Performed by Jennifer Dixon        Radiologic Studies   I have personally reviewed and interpreted all available imaging studies and agree with radiology interpretation. CT HEAD WO CONT   Final Result   No acute intracranial process. Left frontal craniectomy. Chronic left frontal encephalomalacia.  Additional small scattered areas of   hypodensity in the cerebral white matter not atypical for patient age. CT Results  (Last 48 hours)               10/10/21 1949  CT HEAD WO CONT Final result    Impression:  No acute intracranial process. Left frontal craniectomy. Chronic left frontal encephalomalacia. Additional small scattered areas of   hypodensity in the cerebral white matter not atypical for patient age. Narrative:  CLINICAL HISTORY: seizure   INDICATION: seizure   COMPARISON: None. CT dose reduction was achieved through use of a standardized protocol tailored   for this examination and automatic exposure control for dose modulation. TECHNIQUE: Serial axial images with a collimation of 5 mm were obtained from the   skull base through the vertex     FINDINGS:     There is no evidence of an acute infarction, hemorrhage, or mass-effect. There   is no evidence of midline shift or hydrocephalus. Posterior fossa structures are   unremarkable. No extra-axial collections are seen. Mastoid air cells are well pneumatized and clear. There is an area of chronic encephalomalacia subjacent to an area of left   frontal craniectomy. This is in the anterior left frontal lobe. Few scattered   additional hypodensities in the cerebral white matter. CXR Results  (Last 48 hours)    None        Medical Decision Making   I am the first and primary ED physician for this patient's ED visit today. I reviewed our electronic medical record system for any past medical records that may contribute to the patient's current condition, including their past medical history, surgical history, social and family history. This also includes their most recent ED visits, previous hospitalizations and prior diagnostic data. I have reviewed and summarized the most pertinent findings in my HPI and MDM.     Vital Signs Reviewed:  Patient Vitals for the past 24 hrs:   Temp Pulse Resp BP SpO2   10/10/21 1904  81    10/10/21 1845 98.6 °F (37 °C)  16 128/74 94 %     Pulse Oximetry Analysis: 94% on RA    Cardiac Monitor:   Rate: 81 bpm  The cardiac monitor revealed the following rhythm as interpreted by me: Normal Sinus Rhythm  Cardiac monitoring was ordered to monitor patient for signs of cardiac dysrhythmia, which they are at risk for based on their history and/or risk for cardiovascular disease and/or metabolic abnormalities. EKG interpretation:   Billable EKG reviewed by ED Physician in the absence of a cardiologist: Yes  Rhythm: sinus bradycardia; and regular . Rate (approx.): 54; Axis: normal; P wave: normal; QRS interval: normal ; ST/T wave: normal; Other findings: normal. This EKG was interpreted by Lolis Mcdonald MD    Records Reviewed: Nursing Notes, Old Medical Records, Previous electrocardiograms, Previous Radiology Studies and Previous Laboratory Studies, EMS reports    Provider Notes (Medical Decision Making):   Patient presents after a seizure. Currently stable vitals, GCS 15 and no longer post-ictal. DDx: seizure 2/2 noncompliance, infection, increased stressor, electrolyte anomaly (hypoglycemia, etc), ETOH withdrawal. Less likely related to meningitis or brain mass at this time. Will obtain UA, labs and provide loading dose of antiepileptic. Given seizure, I did discuss with the patient about driving restrictions and that he is not allowed to drive for 6 months according to the [place]. ED Course:   Initial assessment performed. I discussed presenting problems and concerns, and my formulated plan for today's visit with the patient and any available family members. I have encouraged them to ask questions as they arise throughout the visit.    Social History     Tobacco Use    Smoking status: Never Smoker    Smokeless tobacco: Never Used   Substance Use Topics    Alcohol use: Not Currently    Drug use: Not on file       ED Orders Placed:  Orders Placed This Encounter    CT HEAD WO CONT    CBC WITH AUTOMATED DIFF    METABOLIC PANEL, COMPREHENSIVE    ETHYL ALCOHOL    PHENOBARBITAL    ADULT DIET Regular    POC GLUCOSE    NURSING-MISCELLANEOUS: Update medication list please ONE TIME    GLUCOSE, POC    EKG 12 LEAD INITIAL    INSERT PERIPHERAL IV ONE TIME STAT    SEIZURE PRECAUTIONS    sodium chloride (NS) flush 5-40 mL    DISCONTD: sodium chloride (NS) flush 5-40 mL    phenytoin (DILANTIN) 1,000 mg in 0.9% sodium chloride 100 mL IVPB       ED Medications Administered During ED Course:  Medications   sodium chloride (NS) flush 5-40 mL (has no administration in time range)   phenytoin (DILANTIN) 1,000 mg in 0.9% sodium chloride 100 mL IVPB (1,000 mg IntraVENous New Bag 10/10/21 2019)        Progress Note:    Progress Note:  Pt reassessed and symptoms noted to have improved significantly after ED treatment. Pt is clinically stable for discharge. Kristina Chaparro's labs and imaging have been reviewed with him and available family. He verbally conveys understanding and agreement of the signs, symptoms, diagnosis, treatment and prognosis and additionally agrees to follow up as recommended with Dr. Fatou Jaramillo MD and/or specialist as instructed. He agrees with the care plan we have created and conveys that all of his questions have been answered. Additionally, I have put together a packet of discharge instructions for him that include: 1) educational information regarding their diagnosis, 2) how to care for their diagnosis at home, as well a 3) list of reasons why they would want to return to the ED prior to their follow-up appointment should the patient's condition change or symptoms worsen. I have answered all questions to the patient's satisfaction. Strict return precautions given. He conveyed understanding and agreement with care plan. Vital signs stable for discharge. Disposition:  DISCHARGE  The pt is ready for discharge.  The pt's signs, symptoms, diagnosis, and discharge instructions have been discussed and pt has conveyed their understanding. The pt is to follow up as recommended or return to ER should their symptoms worsen. Plan has been discussed and pt is in agreement. Plan:  1. Return precautions as discussed with patient and available family/caregiver. 2. There are no discharge medications for this patient. 3.   Follow-up Information     Follow up With Specialties Details Why [address] [facility] EMERGENCY DEPT Emergency Medicine  As needed, If symptoms worsen [address] [facility]    [address] [facility]   As needed, If symptoms worsen [address]  [address] [facility]  [phone]        Instructed to return to ED if worse  Diagnosis   Clinical Impression:  1. Breakthrough seizure ([facility] [address]      Attestation:  Enid Freeman MD, am the attending of record for this patient. I personally performed the services described in this documentation on this date, 10/10/2021 for patient, Chan Cagle. I have reviewed the chart and verified that the record is accurate and complete.

## 2021-10-11 NOTE — ED NOTES
Bedside and Verbal shift change report given to ROSA Moya RN  (oncoming nurse) by Marie Vazquez RN  (offgoing nurse). Report included the following information SBAR, ED Summary, Intake/Output, MAR and Recent Results.

## 2021-10-11 NOTE — DISCHARGE INSTRUCTIONS
Thank You! It was a pleasure taking care of you in our Emergency Department today. We know that when you come to [facility], you are entrusting us with your health, comfort, and safety. Our physicians and nurses honor that trust, and truly appreciate the opportunity to care for you and your loved ones. We also value your feedback. If you receive a survey about your Emergency Department experience today, please fill it out. We care about our patients' feedback, and we listen to what you have to say. Thank you. Dr. Anna Marie Dela Cruz M.D.      ________________________________________________________________________  I have included a copy of your lab results and/or radiologic studies from today's visit so you can have them easily available at your follow-up visit. We hope you feel better and please do not hesitate to contact the ED if you have any questions at all! Recent Results (from the past 12 hour(s))   CBC WITH AUTOMATED DIFF    Collection Time: 10/10/21  7:11 PM   Result Value Ref Range    WBC 4.9 4.1 - 11.1 K/uL    RBC 4.07 (L) 4.10 - 5.70 M/uL    HGB 11.5 (L) 12.1 - 17.0 g/dL    HCT 35.6 (L) 36.6 - 50.3 %    MCV 87.5 80.0 - 99.0 FL    MCH 28.3 26.0 - 34.0 PG    MCHC 32.3 30.0 - 36.5 g/dL    RDW 12.8 11.5 - 14.5 %    PLATELET 944 101 - 526 K/uL    MPV 9.7 8.9 - 12.9 FL    NRBC 0.0 0  WBC    ABSOLUTE NRBC 0.00 0.00 - 0.01 K/uL    NEUTROPHILS 74 32 - 75 %    LYMPHOCYTES 14 12 - 49 %    MONOCYTES 11 5 - 13 %    EOSINOPHILS 1 0 - 7 %    BASOPHILS 0 0 - 1 %    IMMATURE GRANULOCYTES 0 0.0 - 0.5 %    ABS. NEUTROPHILS 3.6 1.8 - 8.0 K/UL    ABS. LYMPHOCYTES 0.7 (L) 0.8 - 3.5 K/UL    ABS. MONOCYTES 0.5 0.0 - 1.0 K/UL    ABS. EOSINOPHILS 0.1 0.0 - 0.4 K/UL    ABS. BASOPHILS 0.0 0.0 - 0.1 K/UL    ABS. IMM.  GRANS. 0.0 0.00 - 0.04 K/UL    DF SMEAR SCANNED      RBC COMMENTS NORMOCYTIC, NORMOCHROMIC     METABOLIC PANEL, COMPREHENSIVE    Collection Time: 10/10/21  7:11 PM Result Value Ref Range    Sodium 141 136 - 145 mmol/L    Potassium 3.8 3.5 - 5.1 mmol/L    Chloride 103 97 - 108 mmol/L    CO2 29 21 - 32 mmol/L    Anion gap 9 5 - 15 mmol/L    Glucose 134 (H) 65 - 100 mg/dL    BUN 17 6 - 20 MG/DL    Creatinine 1.28 0.70 - 1.30 MG/DL    BUN/Creatinine ratio 13 12 - 20      GFR est AA >60 >60 ml/min/1.73m2    GFR est non-AA 56 (L) >60 ml/min/1.73m2    Calcium 9.5 8.5 - 10.1 MG/DL    Bilirubin, total 0.2 0.2 - 1.0 MG/DL    ALT (SGPT) 27 12 - 78 U/L    AST (SGOT) 29 15 - 37 U/L    Alk. phosphatase 132 (H) 45 - 117 U/L    Protein, total 7.1 6.4 - 8.2 g/dL    Albumin 3.3 (L) 3.5 - 5.0 g/dL    Globulin 3.8 2.0 - 4.0 g/dL    A-G Ratio 0.9 (L) 1.1 - 2.2     ETHYL ALCOHOL    Collection Time: 10/10/21  7:11 PM   Result Value Ref Range    ALCOHOL(ETHYL),SERUM <10 <10 MG/DL   EKG, 12 LEAD, INITIAL    Collection Time: 10/10/21  7:57 PM   Result Value Ref Range    Ventricular Rate 54 BPM    Atrial Rate 54 BPM    P-R Interval 132 ms    QRS Duration 82 ms    Q-T Interval 438 ms    QTC Calculation (Bezet) 415 ms    Calculated P Axis 43 degrees    Calculated R Axis 5 degrees    Calculated T Axis 18 degrees    Diagnosis       Sinus bradycardia  Otherwise normal ECG  No previous ECGs available     GLUCOSE, POC    Collection Time: 10/10/21  8:00 PM   Result Value Ref Range    Glucose (POC) 105 65 - 117 mg/dL    Performed by Roslyn Boston        CT HEAD WO CONT   Final Result   No acute intracranial process. Left frontal craniectomy. Chronic left frontal encephalomalacia. Additional small scattered areas of   hypodensity in the cerebral white matter not atypical for patient age. CT Results  (Last 48 hours)                 10/10/21 1949  CT HEAD WO CONT Final result    Impression:  No acute intracranial process. Left frontal craniectomy. Chronic left frontal encephalomalacia. Additional small scattered areas of   hypodensity in the cerebral white matter not atypical for patient age. Narrative:  CLINICAL HISTORY: seizure   INDICATION: seizure   COMPARISON: None. CT dose reduction was achieved through use of a standardized protocol tailored   for this examination and automatic exposure control for dose modulation. TECHNIQUE: Serial axial images with a collimation of 5 mm were obtained from the   skull base through the vertex     FINDINGS:     There is no evidence of an acute infarction, hemorrhage, or mass-effect. There   is no evidence of midline shift or hydrocephalus. Posterior fossa structures are   unremarkable. No extra-axial collections are seen. Mastoid air cells are well pneumatized and clear. There is an area of chronic encephalomalacia subjacent to an area of left   frontal craniectomy. This is in the anterior left frontal lobe. Few scattered   additional hypodensities in the cerebral white matter. The exam and treatment you received in the Emergency Department were for an urgent problem and are not intended as complete care. It is important that you follow up with a doctor, nurse practitioner, or physician assistant for ongoing care. If your symptoms become worse or you do not improve as expected and you are unable to reach your usual health care provider, you should return to the Emergency Department. We are available 24 hours a day. Please take your discharge instructions with you when you go to your follow-up appointment. If a prescription has been provided, please have it filled as soon as possible to prevent a delay in treatment. Read the entire medication instruction sheet provided to you by the pharmacy. If you have any questions or reservations about taking the medication due to side effects or interactions with other medications, please call your primary care physician or contact the ER to speak with the charge nurse.      Please make an appointment with your family doctor or the physician you were referred to for follow-up of this visit as instructed on your discharge paperwork. Return to the ER if you are unable to be seen or if you are unable to be seen in a timely manner. If you have any problem arranging the follow-up visit, contact the Emergency Department immediately.

## 2021-10-11 NOTE — ED NOTES
Patient given copy of dc instructions and 0 script(s). Patient verbalized understanding of instructions and script (s). Patient given a current medication reconciliation form and verbalized understanding of their medications. Patient verbalized understanding of the importance of discussing medications with  his or her physician or clinic they will be following up with. Patient alert and oriented and in no acute distress. Patient discharged home ambulatory with family member.

## 2021-10-11 NOTE — ED NOTES
Bedside and Verbal shift change report given to [address] [place] [address],[address] (oncoming nurse) by Aerial RN (offgoing nurse). Report included the following information SBAR, Kardex, ED Summary and MAR.

## 2021-10-11 NOTE — ED NOTES
Patient here via EMS for c/o seizure. Patient alert with slight confusion, follows commands without complication. Patient states he lives in a group home, states seizure was possibly witnessed by another resident. Patient states that he has not had a problem with seizures for at least the last 2 years. Patient denies changes to medications, however due to disorientation patient states he is unsure if he took his medications earlier today. Patient denies ETOH or drug use. Emergency Department Nursing Plan of Care       The Nursing Plan of Care is developed from the Nursing assessment and Emergency Department Attending provider initial evaluation. The plan of care may be reviewed in the ED Provider note.     The Plan of Care was developed with the following considerations:   Patient / Family readiness to learn indicated by:verbalized understanding  Persons(s) to be included in education: patient  Barriers to Learning/Limitations:No    Signed     Jenaro Frank RN    10/10/2021   8:09 PM

## 2021-10-12 ENCOUNTER — HOSPITAL ENCOUNTER (OUTPATIENT)
Dept: RADIATION THERAPY | Age: 66
Discharge: HOME | End: 2021-10-12

## 2021-10-12 LAB
ATRIAL RATE: 54 BPM
CALCULATED P AXIS, ECG09: 43 DEGREES
CALCULATED R AXIS, ECG10: 5 DEGREES
CALCULATED T AXIS, ECG11: 18 DEGREES
DIAGNOSIS, 93000: NORMAL
P-R INTERVAL, ECG05: 132 MS
Q-T INTERVAL, ECG07: 438 MS
QRS DURATION, ECG06: 82 MS
QTC CALCULATION (BEZET), ECG08: 415 MS
VENTRICULAR RATE, ECG03: 54 BPM

## 2021-10-14 ENCOUNTER — HOSPITAL ENCOUNTER (OUTPATIENT)
Dept: RADIATION THERAPY | Age: 66
Discharge: HOME | End: 2021-10-14

## 2021-10-15 ENCOUNTER — HOSPITAL ENCOUNTER (OUTPATIENT)
Dept: RADIATION THERAPY | Age: 66
Discharge: HOME | End: 2021-10-15

## 2021-10-19 ENCOUNTER — HOSPITAL ENCOUNTER (OUTPATIENT)
Dept: RADIATION THERAPY | Age: 66
Discharge: HOME | End: 2021-10-19

## 2021-10-20 ENCOUNTER — HOSPITAL ENCOUNTER (OUTPATIENT)
Dept: RADIATION THERAPY | Age: 66
Discharge: HOME | End: 2021-10-20

## 2021-10-21 ENCOUNTER — HOSPITAL ENCOUNTER (OUTPATIENT)
Dept: RADIATION THERAPY | Age: 66
Discharge: HOME | End: 2021-10-21

## 2021-10-22 ENCOUNTER — HOSPITAL ENCOUNTER (OUTPATIENT)
Dept: RADIATION THERAPY | Age: 66
Discharge: HOME | End: 2021-10-22

## 2022-01-21 ENCOUNTER — HOSPITAL ENCOUNTER (OUTPATIENT)
Dept: RADIATION THERAPY | Age: 67
Discharge: HOME | End: 2022-01-21

## 2022-03-19 PROBLEM — R41.82 ALTERED MENTAL STATUS: Status: ACTIVE | Noted: 2018-09-06

## 2022-03-19 PROBLEM — R41.82 ALTERED MENTAL STATE: Status: ACTIVE | Noted: 2018-09-07

## 2022-03-19 PROBLEM — I10 HYPERTENSION: Status: ACTIVE | Noted: 2018-09-06

## 2022-03-20 PROBLEM — R56.9 SEIZURE (HCC): Status: ACTIVE | Noted: 2018-09-06

## 2022-03-30 ENCOUNTER — HOSPITAL ENCOUNTER (OUTPATIENT)
Dept: GENERAL RADIOLOGY | Age: 67
Discharge: HOME | End: 2022-03-30
Payer: MEDICARE

## 2022-03-30 ENCOUNTER — TRANSCRIBE ORDER (OUTPATIENT)
Dept: REGISTRATION | Age: 67
End: 2022-03-30

## 2022-03-30 DIAGNOSIS — M54.50 LOW BACK PAIN: ICD-10-CM

## 2022-03-30 DIAGNOSIS — M54.50 LOW BACK PAIN: Primary | ICD-10-CM

## 2022-03-30 PROCEDURE — 72100 X-RAY EXAM L-S SPINE 2/3 VWS: CPT

## 2024-12-10 ENCOUNTER — APPOINTMENT (OUTPATIENT)
Facility: HOSPITAL | Age: 69
End: 2024-12-10
Payer: MEDICARE

## 2024-12-10 ENCOUNTER — HOSPITAL ENCOUNTER (EMERGENCY)
Facility: HOSPITAL | Age: 69
Discharge: HOME | End: 2024-12-10
Payer: MEDICARE

## 2024-12-10 VITALS
WEIGHT: 154 LBS | BODY MASS INDEX: 27.29 KG/M2 | RESPIRATION RATE: 16 BRPM | OXYGEN SATURATION: 100 % | DIASTOLIC BLOOD PRESSURE: 83 MMHG | TEMPERATURE: 98 F | SYSTOLIC BLOOD PRESSURE: 103 MMHG | HEIGHT: 63 IN | HEART RATE: 65 BPM
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
## 2024-12-10 DIAGNOSIS — S69.91XA HAND INJURY, RIGHT, INITIAL ENCOUNTER: Primary | ICD-10-CM

## 2024-12-10 PROCEDURE — 6370000000 HC RX 637 (ALT 250 FOR IP)

## 2024-12-10 PROCEDURE — 73130 X-RAY EXAM OF HAND: CPT

## 2024-12-10 PROCEDURE — 99283 EMERGENCY DEPT VISIT LOW MDM: CPT

## 2024-12-10 RX ORDER — METHOCARBAMOL 500 MG/1
500 TABLET, FILM COATED ORAL 4 TIMES DAILY PRN
Qty: 40 TABLET | Refills: 0 | Status: SHIPPED | OUTPATIENT
Start: 2024-12-10 | End: 2024-12-20

## 2024-12-10 RX ORDER — PHENOBARBITAL 15 MG/1
15 TABLET ORAL 2 TIMES DAILY
COMMUNITY

## 2024-12-10 RX ORDER — HYDROCODONE BITARTRATE AND ACETAMINOPHEN 5; 325 MG/1; MG/1
1 TABLET ORAL
Status: COMPLETED | OUTPATIENT
Start: 2024-12-10 | End: 2024-12-10

## 2024-12-10 RX ORDER — LIDOCAINE 50 MG/G
1 PATCH TOPICAL DAILY
Qty: 10 PATCH | Refills: 0 | Status: SHIPPED | OUTPATIENT
Start: 2024-12-10 | End: 2024-12-20

## 2024-12-10 RX ORDER — IBUPROFEN 800 MG/1
800 TABLET, FILM COATED ORAL EVERY 8 HOURS PRN
Qty: 30 TABLET | Refills: 0 | Status: SHIPPED | OUTPATIENT
Start: 2024-12-10 | End: 2024-12-20

## 2024-12-10 RX ADMIN — HYDROCODONE BITARTRATE AND ACETAMINOPHEN 1 TABLET: 5; 325 TABLET ORAL at 17:08

## 2024-12-10 ASSESSMENT — PAIN SCALES - GENERAL
PAINLEVEL_OUTOF10: 8
PAINLEVEL_OUTOF10: 8

## 2024-12-10 ASSESSMENT — PAIN DESCRIPTION - DESCRIPTORS: DESCRIPTORS: ACHING

## 2024-12-10 ASSESSMENT — PAIN - FUNCTIONAL ASSESSMENT: PAIN_FUNCTIONAL_ASSESSMENT: 0-10

## 2024-12-10 ASSESSMENT — PAIN DESCRIPTION - ORIENTATION: ORIENTATION: RIGHT

## 2024-12-10 ASSESSMENT — PAIN DESCRIPTION - LOCATION
LOCATION: HAND
LOCATION: HAND

## 2024-12-10 NOTE — DISCHARGE INSTRUCTIONS
EXAM: XR HAND RIGHT (MIN 3 VIEWS)     INDICATION: fall x 10 days ago, pain, edema.     COMPARISON: None.     FINDINGS: Three views of the right hand demonstrate no acute fracture or other  acute osseous or articular abnormality. The soft tissues are within normal  limits. A chronic fifth metacarpal fracture healed with mild deformity.     IMPRESSION:  No acute abnormality.     Electronically signed by Greg Goldstein

## 2024-12-10 NOTE — ED NOTES
Pt presents ambulatory to the ED complaining of R hand pain resulting from a fall that occurred 12/1/24. Pt was walking too fast and fell and braced himself with his R hand. Pt has been using ice at home WNR. Pt has impaired mobility of fingers and hand appears swollen.     Emergency Department Nursing Plan of Care       The Nursing Plan of Care is developed from the Nursing assessment and Emergency Department Attending provider initial evaluation.  The plan of care may be reviewed in the “ED Provider note”.      The Plan of Care was developed with the following considerations:  Patient / Family readiness to learn indicated by:verbalized understanding  Persons(s) to be included in education: patient  Barriers to Learning/Limitations:None      Signed     MARY NAGEL RN    12/10/2024   4:29 PM

## 2024-12-10 NOTE — ED TRIAGE NOTES
Pt reports right hand pain following a ground level fall while trying to catch the bus. Pt reports swelling and limited ROM

## 2025-04-08 ENCOUNTER — APPOINTMENT (OUTPATIENT)
Facility: HOSPITAL | Age: 70
End: 2025-04-08
Payer: MEDICARE

## 2025-04-08 ENCOUNTER — HOSPITAL ENCOUNTER (OUTPATIENT)
Facility: HOSPITAL | Age: 70
Setting detail: OBSERVATION
Discharge: HOME | End: 2025-04-10
Attending: INTERNAL MEDICINE | Admitting: INTERNAL MEDICINE
Payer: MEDICARE

## 2025-04-08 DIAGNOSIS — J45.901 EXACERBATION OF ASTHMA, UNSPECIFIED ASTHMA SEVERITY, UNSPECIFIED WHETHER PERSISTENT: Primary | ICD-10-CM

## 2025-04-08 DIAGNOSIS — R93.89 ABNORMAL CHEST X-RAY: ICD-10-CM

## 2025-04-08 DIAGNOSIS — R06.02 SHORTNESS OF BREATH: ICD-10-CM

## 2025-04-08 DIAGNOSIS — R93.89 ABNORMAL CT SCAN: ICD-10-CM

## 2025-04-08 DIAGNOSIS — J98.11 MILD BIBASILAR ATELECTASIS: ICD-10-CM

## 2025-04-08 DIAGNOSIS — R05.1 ACUTE COUGH: ICD-10-CM

## 2025-04-08 LAB
ALBUMIN SERPL-MCNC: 3.5 G/DL (ref 3.5–5)
ALBUMIN/GLOB SERPL: 0.9 (ref 1.1–2.2)
ALP SERPL-CCNC: 172 U/L (ref 45–117)
ALT SERPL-CCNC: 11 U/L (ref 12–78)
AMPHET UR QL SCN: NEGATIVE
ANION GAP SERPL CALC-SCNC: 4 MMOL/L (ref 2–12)
AST SERPL-CCNC: 14 U/L (ref 15–37)
BARBITURATES UR QL SCN: POSITIVE
BASOPHILS # BLD: 0.01 K/UL (ref 0–0.1)
BASOPHILS NFR BLD: 0.3 % (ref 0–1)
BENZODIAZ UR QL: NEGATIVE
BILIRUB SERPL-MCNC: 0.3 MG/DL (ref 0.2–1)
BUN SERPL-MCNC: 19 MG/DL (ref 6–20)
BUN/CREAT SERPL: 19 (ref 12–20)
CALCIUM SERPL-MCNC: 8.7 MG/DL (ref 8.5–10.1)
CANNABINOIDS UR QL SCN: NEGATIVE
CHLORIDE SERPL-SCNC: 103 MMOL/L (ref 97–108)
CO2 SERPL-SCNC: 31 MMOL/L (ref 21–32)
COCAINE UR QL SCN: NEGATIVE
CREAT SERPL-MCNC: 0.98 MG/DL (ref 0.7–1.3)
DIFFERENTIAL METHOD BLD: ABNORMAL
EOSINOPHIL # BLD: 0.04 K/UL (ref 0–0.4)
EOSINOPHIL NFR BLD: 1 % (ref 0–7)
ERYTHROCYTE [DISTWIDTH] IN BLOOD BY AUTOMATED COUNT: 13.3 % (ref 11.5–14.5)
FLUAV RNA SPEC QL NAA+PROBE: NOT DETECTED
FLUBV RNA SPEC QL NAA+PROBE: NOT DETECTED
GLOBULIN SER CALC-MCNC: 3.9 G/DL (ref 2–4)
GLUCOSE SERPL-MCNC: 91 MG/DL (ref 65–100)
HCT VFR BLD AUTO: 36.5 % (ref 36.6–50.3)
HGB BLD-MCNC: 11.8 G/DL (ref 12.1–17)
IMM GRANULOCYTES # BLD AUTO: 0.01 K/UL (ref 0–0.04)
IMM GRANULOCYTES NFR BLD AUTO: 0.3 % (ref 0–0.5)
LYMPHOCYTES # BLD: 2.03 K/UL (ref 0.8–3.5)
LYMPHOCYTES NFR BLD: 51.7 % (ref 12–49)
Lab: ABNORMAL
MAGNESIUM SERPL-MCNC: 2.1 MG/DL (ref 1.6–2.4)
MCH RBC QN AUTO: 28.6 PG (ref 26–34)
MCHC RBC AUTO-ENTMCNC: 32.3 G/DL (ref 30–36.5)
MCV RBC AUTO: 88.4 FL (ref 80–99)
METHADONE UR QL: NEGATIVE
MONOCYTES # BLD: 0.4 K/UL (ref 0–1)
MONOCYTES NFR BLD: 10.2 % (ref 5–13)
NEUTS SEG # BLD: 1.44 K/UL (ref 1.8–8)
NEUTS SEG NFR BLD: 36.5 % (ref 32–75)
NRBC # BLD: 0 K/UL (ref 0–0.01)
NRBC BLD-RTO: 0 PER 100 WBC
NT PRO BNP: 105 PG/ML (ref 0–125)
OPIATES UR QL: NEGATIVE
PCP UR QL: NEGATIVE
PLATELET # BLD AUTO: 155 K/UL (ref 150–400)
PMV BLD AUTO: 10.3 FL (ref 8.9–12.9)
POTASSIUM SERPL-SCNC: 4.3 MMOL/L (ref 3.5–5.1)
PROT SERPL-MCNC: 7.4 G/DL (ref 6.4–8.2)
RBC # BLD AUTO: 4.13 M/UL (ref 4.1–5.7)
SARS-COV-2 RNA RESP QL NAA+PROBE: NOT DETECTED
SODIUM SERPL-SCNC: 138 MMOL/L (ref 136–145)
SOURCE: NORMAL
TROPONIN I SERPL HS-MCNC: 6 NG/L (ref 0–76)
WBC # BLD AUTO: 3.9 K/UL (ref 4.1–11.1)

## 2025-04-08 PROCEDURE — 36415 COLL VENOUS BLD VENIPUNCTURE: CPT

## 2025-04-08 PROCEDURE — 99285 EMERGENCY DEPT VISIT HI MDM: CPT

## 2025-04-08 PROCEDURE — 87449 NOS EACH ORGANISM AG IA: CPT

## 2025-04-08 PROCEDURE — 83735 ASSAY OF MAGNESIUM: CPT

## 2025-04-08 PROCEDURE — 87899 AGENT NOS ASSAY W/OPTIC: CPT

## 2025-04-08 PROCEDURE — 2500000003 HC RX 250 WO HCPCS: Performed by: INTERNAL MEDICINE

## 2025-04-08 PROCEDURE — 80053 COMPREHEN METABOLIC PANEL: CPT

## 2025-04-08 PROCEDURE — 71275 CT ANGIOGRAPHY CHEST: CPT

## 2025-04-08 PROCEDURE — 84484 ASSAY OF TROPONIN QUANT: CPT

## 2025-04-08 PROCEDURE — 96372 THER/PROPH/DIAG INJ SC/IM: CPT

## 2025-04-08 PROCEDURE — 86738 MYCOPLASMA ANTIBODY: CPT

## 2025-04-08 PROCEDURE — 6360000002 HC RX W HCPCS: Performed by: INTERNAL MEDICINE

## 2025-04-08 PROCEDURE — 2500000003 HC RX 250 WO HCPCS: Performed by: PHYSICIAN ASSISTANT

## 2025-04-08 PROCEDURE — 96374 THER/PROPH/DIAG INJ IV PUSH: CPT

## 2025-04-08 PROCEDURE — 94760 N-INVAS EAR/PLS OXIMETRY 1: CPT

## 2025-04-08 PROCEDURE — 80307 DRUG TEST PRSMV CHEM ANLYZR: CPT

## 2025-04-08 PROCEDURE — 1200000000 HC SEMI PRIVATE

## 2025-04-08 PROCEDURE — 6360000002 HC RX W HCPCS: Performed by: PHYSICIAN ASSISTANT

## 2025-04-08 PROCEDURE — 6370000000 HC RX 637 (ALT 250 FOR IP): Performed by: PHYSICIAN ASSISTANT

## 2025-04-08 PROCEDURE — 94640 AIRWAY INHALATION TREATMENT: CPT

## 2025-04-08 PROCEDURE — 2580000003 HC RX 258: Performed by: EMERGENCY MEDICINE

## 2025-04-08 PROCEDURE — 87636 SARSCOV2 & INF A&B AMP PRB: CPT

## 2025-04-08 PROCEDURE — 84145 PROCALCITONIN (PCT): CPT

## 2025-04-08 PROCEDURE — 6360000004 HC RX CONTRAST MEDICATION: Performed by: PHYSICIAN ASSISTANT

## 2025-04-08 PROCEDURE — 93005 ELECTROCARDIOGRAM TRACING: CPT | Performed by: INTERNAL MEDICINE

## 2025-04-08 PROCEDURE — 87070 CULTURE OTHR SPECIMN AEROBIC: CPT

## 2025-04-08 PROCEDURE — 71046 X-RAY EXAM CHEST 2 VIEWS: CPT

## 2025-04-08 PROCEDURE — 85025 COMPLETE CBC W/AUTO DIFF WBC: CPT

## 2025-04-08 PROCEDURE — 83880 ASSAY OF NATRIURETIC PEPTIDE: CPT

## 2025-04-08 PROCEDURE — 6370000000 HC RX 637 (ALT 250 FOR IP): Performed by: INTERNAL MEDICINE

## 2025-04-08 PROCEDURE — 87205 SMEAR GRAM STAIN: CPT

## 2025-04-08 RX ORDER — IOPAMIDOL 755 MG/ML
100 INJECTION, SOLUTION INTRAVASCULAR
Status: COMPLETED | OUTPATIENT
Start: 2025-04-08 | End: 2025-04-08

## 2025-04-08 RX ORDER — IPRATROPIUM BROMIDE AND ALBUTEROL SULFATE 2.5; .5 MG/3ML; MG/3ML
3 SOLUTION RESPIRATORY (INHALATION)
Status: COMPLETED | OUTPATIENT
Start: 2025-04-08 | End: 2025-04-08

## 2025-04-08 RX ORDER — ACETAMINOPHEN 650 MG/1
650 SUPPOSITORY RECTAL EVERY 6 HOURS PRN
Status: DISCONTINUED | OUTPATIENT
Start: 2025-04-08 | End: 2025-04-10 | Stop reason: HOSPADM

## 2025-04-08 RX ORDER — ACETAMINOPHEN 325 MG/1
650 TABLET ORAL EVERY 6 HOURS PRN
Status: DISCONTINUED | OUTPATIENT
Start: 2025-04-08 | End: 2025-04-10 | Stop reason: HOSPADM

## 2025-04-08 RX ORDER — ACETAMINOPHEN/DIPHENHYDRAMINE 500MG-25MG
2 TABLET ORAL NIGHTLY PRN
Status: ON HOLD | COMMUNITY
End: 2025-04-10 | Stop reason: HOSPADM

## 2025-04-08 RX ORDER — SODIUM CHLORIDE 0.9 % (FLUSH) 0.9 %
5-40 SYRINGE (ML) INJECTION PRN
Status: DISCONTINUED | OUTPATIENT
Start: 2025-04-08 | End: 2025-04-10 | Stop reason: HOSPADM

## 2025-04-08 RX ORDER — IPRATROPIUM BROMIDE AND ALBUTEROL SULFATE 2.5; .5 MG/3ML; MG/3ML
1 SOLUTION RESPIRATORY (INHALATION)
Status: DISCONTINUED | OUTPATIENT
Start: 2025-04-08 | End: 2025-04-10 | Stop reason: HOSPADM

## 2025-04-08 RX ORDER — POLYETHYLENE GLYCOL 3350 17 G/17G
17 POWDER, FOR SOLUTION ORAL DAILY PRN
Status: DISCONTINUED | OUTPATIENT
Start: 2025-04-08 | End: 2025-04-10 | Stop reason: HOSPADM

## 2025-04-08 RX ORDER — LEVETIRACETAM 750 MG/1
750 TABLET ORAL 2 TIMES DAILY
COMMUNITY

## 2025-04-08 RX ORDER — ENOXAPARIN SODIUM 100 MG/ML
40 INJECTION SUBCUTANEOUS EVERY EVENING
Status: DISCONTINUED | OUTPATIENT
Start: 2025-04-08 | End: 2025-04-10 | Stop reason: HOSPADM

## 2025-04-08 RX ORDER — 0.9 % SODIUM CHLORIDE 0.9 %
1000 INTRAVENOUS SOLUTION INTRAVENOUS ONCE
Status: COMPLETED | OUTPATIENT
Start: 2025-04-08 | End: 2025-04-08

## 2025-04-08 RX ORDER — ONDANSETRON 2 MG/ML
4 INJECTION INTRAMUSCULAR; INTRAVENOUS EVERY 6 HOURS PRN
Status: DISCONTINUED | OUTPATIENT
Start: 2025-04-08 | End: 2025-04-10 | Stop reason: HOSPADM

## 2025-04-08 RX ORDER — OMEPRAZOLE 20 MG/1
20 CAPSULE, DELAYED RELEASE ORAL
COMMUNITY

## 2025-04-08 RX ORDER — POTASSIUM CHLORIDE 7.45 MG/ML
10 INJECTION INTRAVENOUS PRN
Status: DISCONTINUED | OUTPATIENT
Start: 2025-04-08 | End: 2025-04-10 | Stop reason: HOSPADM

## 2025-04-08 RX ORDER — MAGNESIUM SULFATE IN WATER 40 MG/ML
2000 INJECTION, SOLUTION INTRAVENOUS PRN
Status: DISCONTINUED | OUTPATIENT
Start: 2025-04-08 | End: 2025-04-10 | Stop reason: HOSPADM

## 2025-04-08 RX ORDER — SIMVASTATIN 40 MG
40 TABLET ORAL NIGHTLY
COMMUNITY

## 2025-04-08 RX ORDER — SODIUM CHLORIDE 9 MG/ML
INJECTION, SOLUTION INTRAVENOUS PRN
Status: DISCONTINUED | OUTPATIENT
Start: 2025-04-08 | End: 2025-04-10 | Stop reason: HOSPADM

## 2025-04-08 RX ORDER — POTASSIUM CHLORIDE 750 MG/1
40 TABLET, EXTENDED RELEASE ORAL PRN
Status: DISCONTINUED | OUTPATIENT
Start: 2025-04-08 | End: 2025-04-10 | Stop reason: HOSPADM

## 2025-04-08 RX ORDER — SODIUM CHLORIDE 0.9 % (FLUSH) 0.9 %
5-40 SYRINGE (ML) INJECTION EVERY 12 HOURS SCHEDULED
Status: DISCONTINUED | OUTPATIENT
Start: 2025-04-08 | End: 2025-04-10 | Stop reason: HOSPADM

## 2025-04-08 RX ORDER — ONDANSETRON 4 MG/1
4 TABLET, ORALLY DISINTEGRATING ORAL EVERY 8 HOURS PRN
Status: DISCONTINUED | OUTPATIENT
Start: 2025-04-08 | End: 2025-04-10 | Stop reason: HOSPADM

## 2025-04-08 RX ORDER — PREDNISONE 20 MG/1
40 TABLET ORAL DAILY
Status: DISCONTINUED | OUTPATIENT
Start: 2025-04-09 | End: 2025-04-10 | Stop reason: HOSPADM

## 2025-04-08 RX ADMIN — ENOXAPARIN SODIUM 40 MG: 100 INJECTION SUBCUTANEOUS at 18:36

## 2025-04-08 RX ADMIN — SODIUM CHLORIDE, PRESERVATIVE FREE 10 ML: 5 INJECTION INTRAVENOUS at 21:07

## 2025-04-08 RX ADMIN — ARFORMOTEROL TARTRATE: 15 SOLUTION RESPIRATORY (INHALATION) at 20:02

## 2025-04-08 RX ADMIN — WATER 125 MG: 1 INJECTION INTRAMUSCULAR; INTRAVENOUS; SUBCUTANEOUS at 15:08

## 2025-04-08 RX ADMIN — IPRATROPIUM BROMIDE AND ALBUTEROL SULFATE 3 DOSE: 2.5; .5 SOLUTION RESPIRATORY (INHALATION) at 15:27

## 2025-04-08 RX ADMIN — SODIUM CHLORIDE 1000 ML: 0.9 INJECTION, SOLUTION INTRAVENOUS at 18:01

## 2025-04-08 RX ADMIN — IPRATROPIUM BROMIDE AND ALBUTEROL SULFATE 1 DOSE: .5; 3 SOLUTION RESPIRATORY (INHALATION) at 20:02

## 2025-04-08 RX ADMIN — IOPAMIDOL 100 ML: 755 INJECTION, SOLUTION INTRAVENOUS at 16:30

## 2025-04-08 ASSESSMENT — PAIN DESCRIPTION - DESCRIPTORS: DESCRIPTORS: BURNING;SORE

## 2025-04-08 ASSESSMENT — PAIN DESCRIPTION - FREQUENCY: FREQUENCY: INTERMITTENT

## 2025-04-08 ASSESSMENT — PAIN SCALES - GENERAL: PAINLEVEL_OUTOF10: 8

## 2025-04-08 ASSESSMENT — PAIN DESCRIPTION - ORIENTATION: ORIENTATION: RIGHT;LEFT

## 2025-04-08 ASSESSMENT — PAIN DESCRIPTION - PAIN TYPE: TYPE: ACUTE PAIN

## 2025-04-08 ASSESSMENT — PAIN - FUNCTIONAL ASSESSMENT: PAIN_FUNCTIONAL_ASSESSMENT: 0-10

## 2025-04-08 ASSESSMENT — PAIN DESCRIPTION - LOCATION: LOCATION: CHEST;RIB CAGE

## 2025-04-08 NOTE — ED TRIAGE NOTES
Reports cough x2 1/2 weeks.  Reports pneumonia previously x2 and this feels similar.  Short winded during conversation.

## 2025-04-08 NOTE — ED PROVIDER NOTES
[facility] EMERGENCY DEPARTMENT  EMERGENCY DEPARTMENT ENCOUNTER       Pt Name: William Buchanan  MRN: [id number]  Birthdate 1955  Date of evaluation: 4/8/2025  Provider: LISETTE Easton   PCP: Uma Boone MD  Note Started: 4:57 PM EDT 4/8/25     CHIEF COMPLAINT       Chief Complaint   Patient presents with    Cough        HISTORY OF PRESENT ILLNESS: 1 or more elements      History From: Patient  None     William Buchanan is a 70 y.o. male with a history of seizure disorder, and additional history as noted below, who presents to the ED for evaluation of congestion and cough for the past 2 weeks.  Patient states he was walking at [employer] 2 weeks ago with a T-shirt and no jacket when the weather was cold outside and symptoms began shortly after.  Endorses persistent sinus congestion and intermittent cough ongoing for the past 2 weeks.  States he has had some intermittent wheezing.  Endorses prior history of tobacco use, but he states he stopped smoking about 3 to 4 months ago.  Endorses persistent coughing but denies any chest pain, leg pain or swelling, recent travel or injuries, trauma or surgeries, history of blood clots.  Denies any known sick contacts.  Denies any fevers, abdominal pain, nausea, vomiting, diarrhea, changes in bowel or bladder habits.  States he has attempted to drink hot tea with lemon with no improvement.  Denies prior cardiac history.      Nursing Notes were all reviewed and agreed with or any disagreements were addressed in the HPI.     REVIEW OF SYSTEMS      Review of Systems   All other systems reviewed and are negative.       Positives and Pertinent negatives as per HPI.    PAST HISTORY     Past Medical History:  Past Medical History:   Diagnosis Date    Seizures (HCC)        Past Surgical History:  History reviewed. No pertinent surgical history.    Family History:  History reviewed. No pertinent family history.    Social History:  Social History     Tobacco Use    Smoking    DISCONTINUED MEDICATIONS:  Current Discharge Medication List          I am the Primary Clinician of Record.   LISETTE Easton (electronically signed)    (Please note that parts of this dictation were completed with voice recognition software. Quite often unanticipated grammatical, syntax, homophones, and other interpretive errors are inadvertently transcribed by the computer software. Please disregards these errors. Please excuse any errors that have escaped final proofreading.)         Tiera Tsang PA  04/08/25 9908

## 2025-04-08 NOTE — PROGRESS NOTES
1831- Verbal report received from ED nurse DEANN Springer.    1850- Pt received from ED via Wheelchair. V\S taken.

## 2025-04-08 NOTE — ED NOTES
TRANSFER - OUT REPORT:    Verbal report given to DEANN Floyd on William Buchanan  being transferred to 224 / 01 for routine progression of patient care       Report consisted of patient's Situation, Background, Assessment and   Recommendations(SBAR).     Information from the following report(s) Nurse Handoff Report, ED Encounter Summary, ED SBAR, Adult Overview, Intake/Output, MAR, and Recent Results was reviewed with the receiving nurse.    Helen Fall Assessment:    Presents to emergency department  because of falls (Syncope, seizure, or loss of consciousness): No  Age > 70: No  Altered Mental Status, Intoxication with alcohol or substance confusion (Disorientation, impaired judgment, poor safety awaremess, or inability to follow instructions): No  Impaired Mobility: Ambulates or transfers with assistive devices or assistance; Unable to ambulate or transer.: No  Nursing Judgement: No          Lines:   Peripheral IV 04/08/25 Right Antecubital (Active)        Opportunity for questions and clarification was provided.      Patient transported with:  Monitor and Registered Nurse

## 2025-04-08 NOTE — ED NOTES
Pt presents to ED complaining of a congested, productive cough x 2 weeks. Pt endorses wheezing and diminished breath sounds in his bases. Pt denies N/V/D, cardiac problems, or being around anyone sick. Pt states he was walking to [employer] 2 weeks ago with a t-shirt and no jacket while weather was cold outside when symptoms began. Pt denies taking any medication for symptom relief. Pt states he tried hot team with lemon with no symptom relief. Pt is alert and oriented x 4, RR even and unlabored, skin is warm and dry. Pt appears in NAD at this time. Assessment completed and pt updated on plan of care.  Call bell in reach.  Emergency Department Nursing Plan of Care  The Nursing Plan of Care is developed from the Nursing assessment and Emergency Department Attending provider initial evaluation.  The plan of care may be reviewed in the “ED Provider note”.  The Plan of Care was developed with the following considerations:  Patient / Family readiness to learn indicated by:Refer to Medical chart in [facility]  Persons(s) to be included in education: Refer to Medical chart in [facility]  Barriers to Learning/Limitations:Normal      Signed    Racheal Galvan, DEANN    4/8/2025   2:17 PM

## 2025-04-09 ENCOUNTER — HOSPITAL ENCOUNTER (INPATIENT)
Facility: HOSPITAL | Age: 70
Discharge: HOME | End: 2025-04-11
Attending: INTERNAL MEDICINE
Payer: MEDICARE

## 2025-04-09 PROBLEM — R06.02 SHORTNESS OF BREATH: Status: ACTIVE | Noted: 2025-04-09

## 2025-04-09 LAB
ALBUMIN SERPL-MCNC: 3.1 G/DL (ref 3.5–5)
ALBUMIN/GLOB SERPL: 0.8 (ref 1.1–2.2)
ALP SERPL-CCNC: 160 U/L (ref 45–117)
ALT SERPL-CCNC: 16 U/L (ref 12–78)
ANION GAP SERPL CALC-SCNC: 8 MMOL/L (ref 2–12)
AST SERPL-CCNC: 14 U/L (ref 15–37)
BASOPHILS # BLD: 0.01 K/UL (ref 0–0.1)
BASOPHILS NFR BLD: 0.2 % (ref 0–1)
BILIRUB SERPL-MCNC: 0.3 MG/DL (ref 0.2–1)
BUN SERPL-MCNC: 13 MG/DL (ref 6–20)
BUN/CREAT SERPL: 14 (ref 12–20)
CALCIUM SERPL-MCNC: 8.2 MG/DL (ref 8.5–10.1)
CHLORIDE SERPL-SCNC: 104 MMOL/L (ref 97–108)
CO2 SERPL-SCNC: 28 MMOL/L (ref 21–32)
CREAT SERPL-MCNC: 0.92 MG/DL (ref 0.7–1.3)
DIFFERENTIAL METHOD BLD: ABNORMAL
ECHO AO ASC DIAM: 4.1 CM
ECHO AO ASCENDING AORTA INDEX: 2.41 CM/M2
ECHO AO ROOT DIAM: 4.2 CM
ECHO AO ROOT INDEX: 2.47 CM/M2
ECHO AR MAX VEL PISA: 4.3 M/S
ECHO AV AREA PEAK VELOCITY: 2.6 CM2
ECHO AV AREA VTI: 3.1 CM2
ECHO AV AREA/BSA PEAK VELOCITY: 1.5 CM2/M2
ECHO AV AREA/BSA VTI: 1.8 CM2/M2
ECHO AV MEAN GRADIENT: 2 MMHG
ECHO AV MEAN VELOCITY: 0.6 M/S
ECHO AV PEAK GRADIENT: 4 MMHG
ECHO AV PEAK VELOCITY: 1 M/S
ECHO AV REGURGITANT PHT: 548 MS
ECHO AV VELOCITY RATIO: 0.8
ECHO AV VTI: 17.3 CM
ECHO BSA: 1.72 M2
ECHO LA DIAMETER INDEX: 2.18 CM/M2
ECHO LA DIAMETER: 3.7 CM
ECHO LA TO AORTIC ROOT RATIO: 0.88
ECHO LA VOL A-L A2C: 60 ML (ref 18–58)
ECHO LA VOL A-L A4C: 79 ML (ref 18–58)
ECHO LA VOL BP: 63 ML (ref 18–58)
ECHO LA VOL MOD A2C: 57 ML (ref 18–58)
ECHO LA VOL MOD A4C: 68 ML (ref 18–58)
ECHO LA VOL/BSA BIPLANE: 37 ML/M2 (ref 16–34)
ECHO LA VOLUME AREA LENGTH: 70 ML
ECHO LA VOLUME INDEX A-L A2C: 35 ML/M2 (ref 16–34)
ECHO LA VOLUME INDEX A-L A4C: 46 ML/M2 (ref 16–34)
ECHO LA VOLUME INDEX AREA LENGTH: 41 ML/M2 (ref 16–34)
ECHO LA VOLUME INDEX MOD A2C: 34 ML/M2 (ref 16–34)
ECHO LA VOLUME INDEX MOD A4C: 40 ML/M2 (ref 16–34)
ECHO LV EF PHYSICIAN: 55 %
ECHO LV FRACTIONAL SHORTENING: 33 % (ref 28–44)
ECHO LV INTERNAL DIMENSION DIASTOLE INDEX: 2.88 CM/M2
ECHO LV INTERNAL DIMENSION DIASTOLIC: 4.9 CM (ref 4.2–5.9)
ECHO LV INTERNAL DIMENSION SYSTOLIC INDEX: 1.94 CM/M2
ECHO LV INTERNAL DIMENSION SYSTOLIC: 3.3 CM
ECHO LV IVSD: 0.9 CM (ref 0.6–1)
ECHO LV MASS 2D: 176 G (ref 88–224)
ECHO LV MASS INDEX 2D: 103.6 G/M2 (ref 49–115)
ECHO LV POSTERIOR WALL DIASTOLIC: 1.1 CM (ref 0.6–1)
ECHO LV RELATIVE WALL THICKNESS RATIO: 0.45
ECHO LVOT AREA: 3.1 CM2
ECHO LVOT AV VTI INDEX: 0.96
ECHO LVOT DIAM: 2 CM
ECHO LVOT MEAN GRADIENT: 1 MMHG
ECHO LVOT PEAK GRADIENT: 3 MMHG
ECHO LVOT PEAK VELOCITY: 0.8 M/S
ECHO LVOT STROKE VOLUME INDEX: 30.7 ML/M2
ECHO LVOT SV: 52.1 ML
ECHO LVOT VTI: 16.6 CM
ECHO MV A VELOCITY: 0.39 M/S
ECHO MV AREA PHT: 4.2 CM2
ECHO MV E DECELERATION TIME (DT): 181.5 MS
ECHO MV E VELOCITY: 0.49 M/S
ECHO MV E/A RATIO: 1.26
ECHO MV PRESSURE HALF TIME (PHT): 52.6 MS
ECHO RV INTERNAL DIMENSION: 3.4 CM
ECHO TV REGURGITANT MAX VELOCITY: 2.24 M/S
ECHO TV REGURGITANT PEAK GRADIENT: 20 MMHG
EKG ATRIAL RATE: 66 BPM
EKG DIAGNOSIS: NORMAL
EKG P AXIS: 32 DEGREES
EKG P-R INTERVAL: 138 MS
EKG Q-T INTERVAL: 422 MS
EKG QRS DURATION: 86 MS
EKG QTC CALCULATION (BAZETT): 442 MS
EKG R AXIS: 14 DEGREES
EKG T AXIS: 28 DEGREES
EKG VENTRICULAR RATE: 66 BPM
EOSINOPHIL # BLD: 0.01 K/UL (ref 0–0.4)
EOSINOPHIL NFR BLD: 0.2 % (ref 0–7)
ERYTHROCYTE [DISTWIDTH] IN BLOOD BY AUTOMATED COUNT: 13.2 % (ref 11.5–14.5)
GLOBULIN SER CALC-MCNC: 3.8 G/DL (ref 2–4)
GLUCOSE SERPL-MCNC: 88 MG/DL (ref 65–100)
HCT VFR BLD AUTO: 33.4 % (ref 36.6–50.3)
HGB BLD-MCNC: 11 G/DL (ref 12.1–17)
IMM GRANULOCYTES # BLD AUTO: 0 K/UL (ref 0–0.04)
IMM GRANULOCYTES NFR BLD AUTO: 0 % (ref 0–0.5)
LYMPHOCYTES # BLD: 1.66 K/UL (ref 0.8–3.5)
LYMPHOCYTES NFR BLD: 40.7 % (ref 12–49)
MAGNESIUM SERPL-MCNC: 2.1 MG/DL (ref 1.6–2.4)
MCH RBC QN AUTO: 28.9 PG (ref 26–34)
MCHC RBC AUTO-ENTMCNC: 32.9 G/DL (ref 30–36.5)
MCV RBC AUTO: 87.9 FL (ref 80–99)
MONOCYTES # BLD: 0.54 K/UL (ref 0–1)
MONOCYTES NFR BLD: 13.2 % (ref 5–13)
NEUTS SEG # BLD: 1.86 K/UL (ref 1.8–8)
NEUTS SEG NFR BLD: 45.7 % (ref 32–75)
NRBC # BLD: 0 K/UL (ref 0–0.01)
NRBC BLD-RTO: 0 PER 100 WBC
PHOSPHATE SERPL-MCNC: 3.6 MG/DL (ref 2.6–4.7)
PLATELET # BLD AUTO: 148 K/UL (ref 150–400)
PMV BLD AUTO: 10 FL (ref 8.9–12.9)
POTASSIUM SERPL-SCNC: 4.2 MMOL/L (ref 3.5–5.1)
PROCALCITONIN SERPL-MCNC: <0.05 NG/ML
PROT SERPL-MCNC: 6.9 G/DL (ref 6.4–8.2)
RBC # BLD AUTO: 3.8 M/UL (ref 4.1–5.7)
SODIUM SERPL-SCNC: 140 MMOL/L (ref 136–145)
WBC # BLD AUTO: 4.1 K/UL (ref 4.1–11.1)

## 2025-04-09 PROCEDURE — G0378 HOSPITAL OBSERVATION PER HR: HCPCS

## 2025-04-09 PROCEDURE — 36415 COLL VENOUS BLD VENIPUNCTURE: CPT

## 2025-04-09 PROCEDURE — 93306 TTE W/DOPPLER COMPLETE: CPT | Performed by: SPECIALIST

## 2025-04-09 PROCEDURE — 80053 COMPREHEN METABOLIC PANEL: CPT

## 2025-04-09 PROCEDURE — 96372 THER/PROPH/DIAG INJ SC/IM: CPT

## 2025-04-09 PROCEDURE — 2500000003 HC RX 250 WO HCPCS: Performed by: INTERNAL MEDICINE

## 2025-04-09 PROCEDURE — 94640 AIRWAY INHALATION TREATMENT: CPT

## 2025-04-09 PROCEDURE — 6360000002 HC RX W HCPCS: Performed by: INTERNAL MEDICINE

## 2025-04-09 PROCEDURE — 84100 ASSAY OF PHOSPHORUS: CPT

## 2025-04-09 PROCEDURE — 93010 ELECTROCARDIOGRAM REPORT: CPT | Performed by: SPECIALIST

## 2025-04-09 PROCEDURE — 6370000000 HC RX 637 (ALT 250 FOR IP): Performed by: INTERNAL MEDICINE

## 2025-04-09 PROCEDURE — 85025 COMPLETE CBC W/AUTO DIFF WBC: CPT

## 2025-04-09 PROCEDURE — 93306 TTE W/DOPPLER COMPLETE: CPT

## 2025-04-09 PROCEDURE — 83735 ASSAY OF MAGNESIUM: CPT

## 2025-04-09 RX ORDER — PHENOBARBITAL 32.4 MG/1
32.4 TABLET ORAL 2 TIMES DAILY
Status: DISCONTINUED | OUTPATIENT
Start: 2025-04-09 | End: 2025-04-09

## 2025-04-09 RX ORDER — ATORVASTATIN CALCIUM 10 MG/1
20 TABLET, FILM COATED ORAL DAILY
Status: DISCONTINUED | OUTPATIENT
Start: 2025-04-09 | End: 2025-04-10 | Stop reason: HOSPADM

## 2025-04-09 RX ORDER — PHENOBARBITAL 32.4 MG/1
64.8 TABLET ORAL
COMMUNITY

## 2025-04-09 RX ORDER — PANTOPRAZOLE SODIUM 40 MG/1
40 TABLET, DELAYED RELEASE ORAL
Status: DISCONTINUED | OUTPATIENT
Start: 2025-04-09 | End: 2025-04-10 | Stop reason: HOSPADM

## 2025-04-09 RX ORDER — PHENOBARBITAL 32.4 MG/1
64.8 TABLET ORAL NIGHTLY
Status: DISCONTINUED | OUTPATIENT
Start: 2025-04-09 | End: 2025-04-10 | Stop reason: HOSPADM

## 2025-04-09 RX ORDER — PHENOBARBITAL 32.4 MG/1
32.4 TABLET ORAL DAILY
Status: DISCONTINUED | OUTPATIENT
Start: 2025-04-10 | End: 2025-04-10 | Stop reason: HOSPADM

## 2025-04-09 RX ADMIN — SODIUM CHLORIDE, PRESERVATIVE FREE 10 ML: 5 INJECTION INTRAVENOUS at 09:25

## 2025-04-09 RX ADMIN — POLYETHYLENE GLYCOL 3350 17 G: 17 POWDER, FOR SOLUTION ORAL at 09:17

## 2025-04-09 RX ADMIN — ENOXAPARIN SODIUM 40 MG: 100 INJECTION SUBCUTANEOUS at 19:07

## 2025-04-09 RX ADMIN — PANTOPRAZOLE SODIUM 40 MG: 40 TABLET, DELAYED RELEASE ORAL at 09:17

## 2025-04-09 RX ADMIN — PHENOBARBITAL 64.8 MG: 32.4 TABLET ORAL at 20:56

## 2025-04-09 RX ADMIN — ARFORMOTEROL TARTRATE: 15 SOLUTION RESPIRATORY (INHALATION) at 19:36

## 2025-04-09 RX ADMIN — SERTRALINE HYDROCHLORIDE 50 MG: 50 TABLET ORAL at 09:11

## 2025-04-09 RX ADMIN — LEVETIRACETAM 750 MG: 500 TABLET, FILM COATED ORAL at 20:56

## 2025-04-09 RX ADMIN — SODIUM CHLORIDE, PRESERVATIVE FREE 10 ML: 5 INJECTION INTRAVENOUS at 20:57

## 2025-04-09 RX ADMIN — ARFORMOTEROL TARTRATE: 15 SOLUTION RESPIRATORY (INHALATION) at 07:56

## 2025-04-09 RX ADMIN — IPRATROPIUM BROMIDE AND ALBUTEROL SULFATE 1 DOSE: .5; 3 SOLUTION RESPIRATORY (INHALATION) at 12:54

## 2025-04-09 RX ADMIN — PHENOBARBITAL 32.4 MG: 32.4 TABLET ORAL at 09:11

## 2025-04-09 RX ADMIN — IPRATROPIUM BROMIDE AND ALBUTEROL SULFATE 1 DOSE: .5; 3 SOLUTION RESPIRATORY (INHALATION) at 16:58

## 2025-04-09 RX ADMIN — IPRATROPIUM BROMIDE AND ALBUTEROL SULFATE 1 DOSE: .5; 3 SOLUTION RESPIRATORY (INHALATION) at 19:36

## 2025-04-09 RX ADMIN — IPRATROPIUM BROMIDE AND ALBUTEROL SULFATE 1 DOSE: .5; 3 SOLUTION RESPIRATORY (INHALATION) at 07:56

## 2025-04-09 RX ADMIN — LEVETIRACETAM 750 MG: 500 TABLET, FILM COATED ORAL at 09:11

## 2025-04-09 RX ADMIN — ATORVASTATIN CALCIUM 20 MG: 10 TABLET, FILM COATED ORAL at 09:11

## 2025-04-09 RX ADMIN — PREDNISONE 40 MG: 20 TABLET ORAL at 09:11

## 2025-04-09 NOTE — PROGRESS NOTES
1950 - Bedside and Verbal shift change report given to Lavinia RN (oncoming nurse) by Carlita RN (offgoing nurse). Report included the following information Nurse Handoff Report, Adult Overview, Intake/Output, MAR, and Recent Results.

## 2025-04-09 NOTE — PROGRESS NOTES
[facility]: MED HISTORY    Comments/Recommendations:   Source: patient's sister Keyona ([phone]), patient interview (poor historian/confused about medications), [facility], RX Query, [facility] (bubble packs for patient)  Per [facility], phenytoin was a medication patient was on many years ago but it caused too much somnolence so he was taken off it.  [facility] did not have any phenytoin record since 2023. Confirmed patient is currently on phenobarbital and levetiracetam only. Adjusted phenobarbital dose to prescribed dose as patient was very confused and uses bubble packs for all medications (thus prescribed dose is what patient should be taking).   Updated preferred pharmacy.     Prior to Admission Medications   Prescriptions Last Dose Informant   PHENobarbital 32.4 MG tablet 4/8/2025 Morning Self, Other   Sig: Take 1 tablet by mouth daily.   PHENobarbital 32.4 MG tablet 4/8/2025 Bedtime    Sig: Take 2 tablets by mouth nightly.   levETIRAcetam (KEPPRA) 750 MG tablet 4/7/2025 Evening Self, Other   Sig: Take 1 tablet by mouth 2 times daily   omeprazole (PRILOSEC) 20 MG delayed release capsule 4/7/2025 Morning Self, Other   Sig: Take 1 capsule by mouth daily   sertraline (ZOLOFT) 50 MG tablet 4/7/2025 Morning Self, Other   Sig: Take 1 tablet by mouth daily   simvastatin (ZOCOR) 40 MG tablet 4/7/2025 Bedtime Self, Other   Sig: Take 1 tablet by mouth nightly      Facility-Administered Medications: None             Kayley Sloan RPH  Contact: [phone]

## 2025-04-09 NOTE — ED PROVIDER NOTES
EKG 12 Lead    Date/Time: 4/9/2025 4:02 PM    Performed by: Patrick Mejia MD  Authorized by: Ryan Castle MD    ECG interpreted by ED Physician in the absence of a cardiologist: yes    Interpretation:     Interpretation: normal    Rate:     ECG rate assessment: normal    Rhythm:     Rhythm: sinus rhythm    Ectopy:     Ectopy: none          Patrick Mejia MD  04/09/25 1600

## 2025-04-09 NOTE — PLAN OF CARE
Problem: Discharge Planning  Goal: Discharge to home or other facility with appropriate resources  Outcome: Progressing     Problem: Pain  Goal: Verbalizes/displays adequate comfort level or baseline comfort level  4/9/2025 1047 by Fadia Kohli RN  Outcome: Progressing  4/8/2025 2232 by Lavinia Puentes RN  Outcome: Progressing     Problem: Respiratory - Adult  Goal: Achieves optimal ventilation and oxygenation  4/9/2025 1047 by Fadia Kohli RN  Outcome: Progressing  4/8/2025 2232 by Lavinia Puentes RN  Outcome: Progressing     Problem: Cardiovascular - Adult  Goal: Maintains optimal cardiac output and hemodynamic stability  4/9/2025 1047 by Fadia Kohli RN  Outcome: Progressing  4/8/2025 2232 by Lavinia Puentes RN  Outcome: Progressing     Problem: Musculoskeletal - Adult  Goal: Return mobility to safest level of function  4/9/2025 1047 by Fadia Kohli RN  Outcome: Progressing  4/8/2025 2232 by Lavinia Puentes RN  Outcome: Progressing     Problem: Safety - Adult  Goal: Free from fall injury  Outcome: Progressing

## 2025-04-09 NOTE — PROGRESS NOTES
[facility] Admission Medication History: IN PROGRESS, INTERPRET WITH CAUTION    Information obtained from:Patient interview at bedside, RxQuery, CareEverywhere       Comments/recommendations:  Patient is a fair historian of medications.  OF NOTE, the patient reports that he takes Dilantin (and appropriately described the appearance of Dilantin 100mg capsules), but I did not find a record of prescription for Dilantin/phenytoin in RxQuery or CareEverywhere.  Pharmacy will  call office of  NP Louisa Nichols at [facility] ([phone]) to inquire about phenytoin prescription..  Dispense records indicate a total of 3 tablets prescribed daily (consistent dispensing of 90 tablets for 30 day supply), however patient reports that he takes 1 tablet in the morning and 1 tablet in the evening      Medication changes (since last review):  Added  Levetiracetam 750 mg   Omeprazole 20 mg  Sertraline 50 mg  Simvastatin 40 mg  Tylenol PM  Removed  Ibuprofen 800 mg every 8 hours as needed for pain  Adjusted  Phenobarbital dose adjusted from 15 mg to 32.4 mg.     [facility] reflects medications filled and processed through the patient's insurance, however                this data does NOT capture whether the medication was picked up or is currently being taken by the patient.         Patient allergies:   Allergies as of 04/08/2025    (No Known Allergies)         Prior to Admission Medications   Prescriptions Last Dose Informant   PHENobarbital 32.4 MG tablet 4/8/2025 Morning Self, Other   Sig: Take 1 tablet by mouth 2 times daily.   diphenhydrAMINE-APAP, sleep, (TYLENOL PM EXTRA STRENGTH)  MG tablet 4/7/2025 Self, Other   Sig: Take 2 tablets by mouth nightly as needed for Sleep   ibuprofen (ADVIL;MOTRIN) 800 MG tablet Not Taking    Sig: Take 1 tablet by mouth every 8 hours as needed for Pain   Patient not taking: Reported on 4/8/2025   levETIRAcetam (KEPPRA) 750 MG tablet 4/7/2025 Evening Self, Other   Sig:

## 2025-04-09 NOTE — PROGRESS NOTES
Bedside and verbal shift report given to DEANN Loaiza (oncoming nurse) from Lavinia RN (offgoing nurse). Information discussed includes adult overview, MAR and recent lab results. Patient received awake in bed  1800 pt had a calm day received his breathing treatment which gave relieve  1920 Verbal report given to Lavinia ZACARIAS (oncoming nurse) by DEANN Loaiza (offgoing nurse).

## 2025-04-09 NOTE — H&P
Patient: William Buchanan   70 y.o. male   : 1955   MRN: [id number] Attending: Dorothy Dupree DO  Full Code  PRIMARY CARE MD: Uma Boone MD      ASSESSMENT & PLAN  Exacerbation of likely COPD. Patient received one dose of Solu-Medrol 125 mg IV in the emergency department. Steroids continued with prednisone 40 mg pure daily. Duoneb Q for hours while awake. Brovana 15 µg /Pulmicort 0.5 MG nebulizer treatments twice a day. Echocardiogram to assess for possible pulmonary hypertension/other structural heart disease.  History of heavy tobacco use. Stopped in  after 55 years.  Seizure disorder. Pharmacy is currently verifying patient's home antiepileptic medications. Apparently, patient is currently taking dilantin.  3 mm left lower lobe pulmonary nodule. Will need follow up CT scan outpatient, as patient is at high risk for lung cancer.            Chief Complaint:   Chief Complaint   Patient presents with    Cough       History Gathered From: patient    History of Present Illness  Patient is a 70-year-old male who is completely independent in the activities of daily life, and lives in his own home. He states that he stopped smoking cigarettes in 2025 after one pack per day since age 14, approximately 55 years. Patient states that he does not have a nebulizer machine at home, and has never used home oxygen. He states that in 2025, approximately six weeks ago,  he started having a cough. The cough has been productive with greenish phlegm, but has been improving. He states that he has not been to see a doctor for this cough. He does has not been on any steroids or antibiotics.  He started having shortness of breath 2 to 3 weeks ago. Patient states that he now finds himself short of breath with walking one block on level ground. He states that he does not find himself short of breath, walking around his home from one room to another. Patient denies any swelling in

## 2025-04-09 NOTE — PROGRESS NOTES
1920 - Bedside and Verbal shift change report given to Lavinia ZACARIAS (oncoming nurse) by Fadia HERNANDES RN (offgoing nurse). Report included the following information Nurse Handoff Report, Adult Overview, Intake/Output, MAR, and Recent Results.

## 2025-04-09 NOTE — PLAN OF CARE
Problem: Pain  Goal: Verbalizes/displays adequate comfort level or baseline comfort level  Outcome: Progressing     Problem: Respiratory - Adult  Goal: Achieves optimal ventilation and oxygenation  Outcome: Progressing     Problem: Cardiovascular - Adult  Goal: Maintains optimal cardiac output and hemodynamic stability  Outcome: Progressing     Problem: Musculoskeletal - Adult  Goal: Return mobility to safest level of function  Outcome: Progressing

## 2025-04-09 NOTE — PROGRESS NOTES
[facility] Hospitalist Group                                                                               Hospitalist Progress Note  Christi Capps MD          Date of Service:  2025  NAME:  William Buchanan  :  1955  MRN:  [id number]    Please note that this dictation was completed with [employer], the computer voice recognition software.  Quite often unanticipated grammatical, syntax, homophones, and other interpretive errors are inadvertently transcribed by the computer software.  Please disregard these errors.  Please excuse any errors that have escaped final proofreading.    Admission Summary:   Patient is a 70-year-old male who is completely independent in the activities of daily life, and lives in his own home. He states that he stopped smoking cigarettes in 2025 after one pack per day since age 14, approximately 55 years. Patient states that he does not have a nebulizer machine at home, and has never used home oxygen. He states that in 2025, approximately six weeks ago,  he started having a cough. The cough has been productive with greenish phlegm, but has been improving. He states that he has not been to see a doctor for this cough. He does has not been on any steroids or antibiotics.  He started having shortness of breath 2 to 3 weeks ago. Patient states that he now finds himself short of breath with walking one block on level ground. He states that he does not find himself short of breath, walking around his home from one room to another. Patient denies any swelling in the legs.He states that he may have had some wheezing, but he cannot be sure. Patient states that he has never been diagnosed with asthma.     Patient denies any chest pain.       Interval history / Subjective:   Patient was admitted yesterday.  H&P reviewed.  Patient is feeling better.  Speaking in full sentences.    Patient denies headache, vision changes, fever, chills, weakness, chest

## 2025-04-10 VITALS
HEART RATE: 73 BPM | TEMPERATURE: 98.1 F | DIASTOLIC BLOOD PRESSURE: 78 MMHG | BODY MASS INDEX: 26.13 KG/M2 | RESPIRATION RATE: 17 BRPM | WEIGHT: 147.49 LBS | OXYGEN SATURATION: 97 % | HEIGHT: 63 IN | SYSTOLIC BLOOD PRESSURE: 112 MMHG

## 2025-04-10 LAB
BACTERIA SPEC CULT: NORMAL
FLUID CULTURE: NORMAL
GRAM STN SPEC: NORMAL
L PNEUMO1 AG UR QL IA: NEGATIVE
Lab: NORMAL
ORGANISM ID: NORMAL
S PNEUM AG SPEC QL LA: NEGATIVE
SERVICE CMNT-IMP: NORMAL
SPECIMEN SOURCE: NORMAL
SPECIMEN SOURCE: NORMAL
SPECIMEN: NORMAL

## 2025-04-10 PROCEDURE — 6370000000 HC RX 637 (ALT 250 FOR IP): Performed by: INTERNAL MEDICINE

## 2025-04-10 PROCEDURE — 94640 AIRWAY INHALATION TREATMENT: CPT

## 2025-04-10 PROCEDURE — G0378 HOSPITAL OBSERVATION PER HR: HCPCS

## 2025-04-10 PROCEDURE — 6360000002 HC RX W HCPCS: Performed by: INTERNAL MEDICINE

## 2025-04-10 PROCEDURE — 2500000003 HC RX 250 WO HCPCS: Performed by: INTERNAL MEDICINE

## 2025-04-10 RX ORDER — ALBUTEROL SULFATE 90 UG/1
2 INHALANT RESPIRATORY (INHALATION) 4 TIMES DAILY PRN
Qty: 18 G | Refills: 0 | Status: SHIPPED | OUTPATIENT
Start: 2025-04-10

## 2025-04-10 RX ORDER — PREDNISONE 20 MG/1
40 TABLET ORAL DAILY
Qty: 4 TABLET | Refills: 0 | Status: SHIPPED | OUTPATIENT
Start: 2025-04-11 | End: 2025-04-13

## 2025-04-10 RX ADMIN — SERTRALINE HYDROCHLORIDE 50 MG: 50 TABLET ORAL at 09:24

## 2025-04-10 RX ADMIN — PREDNISONE 40 MG: 20 TABLET ORAL at 09:24

## 2025-04-10 RX ADMIN — LEVETIRACETAM 750 MG: 500 TABLET, FILM COATED ORAL at 09:24

## 2025-04-10 RX ADMIN — PANTOPRAZOLE SODIUM 40 MG: 40 TABLET, DELAYED RELEASE ORAL at 09:24

## 2025-04-10 RX ADMIN — ARFORMOTEROL TARTRATE: 15 SOLUTION RESPIRATORY (INHALATION) at 07:53

## 2025-04-10 RX ADMIN — PHENOBARBITAL 32.4 MG: 32.4 TABLET ORAL at 09:24

## 2025-04-10 RX ADMIN — IPRATROPIUM BROMIDE AND ALBUTEROL SULFATE 1 DOSE: .5; 3 SOLUTION RESPIRATORY (INHALATION) at 07:53

## 2025-04-10 RX ADMIN — IPRATROPIUM BROMIDE AND ALBUTEROL SULFATE 1 DOSE: .5; 3 SOLUTION RESPIRATORY (INHALATION) at 11:38

## 2025-04-10 RX ADMIN — ATORVASTATIN CALCIUM 20 MG: 10 TABLET, FILM COATED ORAL at 09:24

## 2025-04-10 RX ADMIN — SODIUM CHLORIDE, PRESERVATIVE FREE 10 ML: 5 INJECTION INTRAVENOUS at 09:25

## 2025-04-10 NOTE — PROGRESS NOTES
Spiritual Health History and Assessment/Progress Note  [facility]    Spiritual/Emotional Needs, Initial Encounter,  ,  ,      Name: William Buchanan MRN: [id number]    Age: 70 y.o.     Sex: male   Language: English   Hoahaoism: None   Acute asthma exacerbation     Date: 4/10/2025            Total Time Calculated: 81 min              Spiritual Assessment began in [facility] 2 MED SURG & TELE            Encounter Overview/Reason: Spiritual/Emotional Needs, Initial Encounter  Service Provided For: Patient    Beatrice, Belief, Meaning:   Patient is connected with a beatrice tradition or spiritual practice and has beliefs or practices that help with coping during difficult times  Family/Friends No family/friends present      Importance and Influence:  Patient has no beliefs influential to healthcare decision-making identified during this visit  Family/Friends No family/friends present    Community:  Patient Other: Patient shared that he doesn't have much support, but he is good.  Family/Friends No family/friends present    Assessment and Plan of Care:     Patient Interventions include: Facilitated expression of thoughts and feelings, Explored spiritual coping/struggle/distress, Engaged in theological reflection, and Affirmed coping skills/support systems  Family/Friends Interventions include: No family/friends present    Patient Plan of Care: Spiritual Care available upon further referral  Family/Friends Plan of Care: No family/friends present    Electronically signed by ERMIAS Rao on 4/10/2025 at 2:58 PM

## 2025-04-10 NOTE — DISCHARGE INSTRUCTIONS
It is important that you take the medication exactly as they are prescribed.   Keep your medication in the bottles provided by the pharmacist and keep a list of the medication names, dosages, and times to be taken in your wallet.   Do not take other medications without consulting your doctor.       NOTIFY YOUR PHYSICIAN OR GO TO THE NEAREST EMERGENCY ROOM FOR ANY OF THE FOLLOWING:   Fever over 101 degrees for 24 hours.   Chest pain, shortness of breath, fever, chills, nausea, vomiting, diarrhea, change in mentation, falling, weakness, bleeding. Severe pain or pain not relieved by medications.  Or, any other signs or symptoms that you may have questions about.      Note: You are admitted to hospital with cough/shortness of breath which has improved.  You need to follow-up with primary care physician for continued healthcare management/screening  You need to follow-up with lung specialist (pulmonologist) for further evaluation of shortness of breath/Possible COPD, as it was explained to you.  You need to follow-up with heart specialist (cardiologist) for enlarged aorta (aortic aneurysm) incidentally found on imaging, as it was explained to you.

## 2025-04-10 NOTE — PROGRESS NOTES
Pharmacist Discharge Medication Reconciliation    Discharging Provider: Dr. Sergey Capps    Significant PMH:   Past Medical History:   Diagnosis Date    Arthritis of ankle     Hearing loss     Hyperlipidemia     Seizures (HCC)      Chief Complaint for this Admission:   Chief Complaint   Patient presents with    Cough     Allergies: Patient has no known allergies.    Discharge Medications:   Current Discharge Medication List        START taking these medications    Details   predniSONE (DELTASONE) 20 MG tablet Take 2 tablets by mouth daily for 2 doses  Qty: 4 tablet, Refills: 0      albuterol sulfate HFA (VENTOLIN HFA) 108 (90 Base) MCG/ACT inhaler Inhale 2 puffs into the lungs 4 times daily as needed for Wheezing  Qty: 18 g, Refills: 0           CONTINUE these medications which have NOT CHANGED    Details   PHENobarbital 32.4 MG tablet Take 2 tablets by mouth nightly. 32.4 mg q AM, 64.8 mg q PM      levETIRAcetam (KEPPRA) 750 MG tablet Take 1 tablet by mouth 2 times daily      omeprazole (PRILOSEC) 20 MG delayed release capsule Take 1 capsule by mouth every morning (before breakfast)      sertraline (ZOLOFT) 50 MG tablet Take 1 tablet by mouth daily      simvastatin (ZOCOR) 40 MG tablet Take 1 tablet by mouth nightly      PHENobarbital 32.4 MG tablet Take 1 tablet by mouth daily. 32.4 mg q AM, 64.8 mg q PM              STOP taking these medications       diphenhydrAMINE-APAP, sleep, (TYLENOL PM EXTRA STRENGTH)  MG tablet Comments:   Reason for Stopping:         ibuprofen (ADVIL;MOTRIN) 800 MG tablet Comments:   Reason for Stopping:               @  The patient's chart, MAR and AVS were reviewed by Antolin [facility].      Time spent: 10 min

## 2025-04-10 NOTE — PROGRESS NOTES
[facility]                                                                               Hospitalist Progress Note  Christi Capps MD          Date of Service:  4/10/2025  NAME:  William Buchanan  :  1955  MRN:  [id number]    Please note that this dictation was completed with [employer], the computer voice recognition software.  Quite often unanticipated grammatical, syntax, homophones, and other interpretive errors are inadvertently transcribed by the computer software.  Please disregard these errors.  Please excuse any errors that have escaped final proofreading.    Admission Summary:   Patient is a 70-year-old male who is completely independent in the activities of daily life, and lives in his own home. He states that he stopped smoking cigarettes in 2025 after one pack per day since age 14, approximately 55 years. Patient states that he does not have a nebulizer machine at home, and has never used home oxygen. He states that in 2025, approximately six weeks ago,  he started having a cough. The cough has been productive with greenish phlegm, but has been improving. He states that he has not been to see a doctor for this cough. He does has not been on any steroids or antibiotics.  He started having shortness of breath 2 to 3 weeks ago. Patient states that he now finds himself short of breath with walking one block on level ground. He states that he does not find himself short of breath, walking around his home from one room to another. Patient denies any swelling in the legs.He states that he may have had some wheezing, but he cannot be sure. Patient states that he has never been diagnosed with asthma.     Patient denies any chest pain.       Interval history / Subjective:   Uneventful night.  Wants to go home.  Speaking in full sentences.  Ambulating independently.    Patient denies headache, vision changes, fever, chills, weakness, chest

## 2025-04-10 NOTE — CARE COORDINATION
Transition of Care Plan:    RUR: 9%  Prior Level of Functioning: IND  Disposition: HOME  BRADY: 4/10/25  If SNF or IPR: Date FOC offered: N/A    Follow up appointments: ON AVS  CARDIOLOGY AND PULMONOLOGY RECEIVED THE EARLIEST AVAILBLE FOR PREFERRED LOCATIONS.  UNABLE TO REACH DR. REYNOSO OFFICE CM SENT MESSAGE TO DAILY PLANET MEDICAL DIRECTOR FOR APPOINTMENT, AS THE MEDICAL MANGER IS OUT OF THE OFFICE  DAILY PLANET TO PHONE PATIENTS SISTER TO SCHEDULE APPOINTMENT AND SISTER VERBALIZED UNDERSTANDING  DME needed: N/A  Transportation at discharge: MEDICAID  IM/IMM Medicare/ letter given: 2ND ONE NOT NEEDED. PATIENT TRANSITIONED TO OBS  Is patient a  and connected with [facility]? N/A  Caregiver Contact: SISTER JELLY LARSON ARRANGES APPOINTMENTS AND TRANSPORTATION  Discharge Caregiver contacted prior to discharge? Y  Care Conference needed? N  Barriers to discharge: NONE    [employer] TRANSPORTATION MEDICAID  RESERVATION NUMBER  [id number]    SILVIANO PRIETO RN CM

## 2025-04-10 NOTE — PLAN OF CARE
Problem: Discharge Planning  Goal: Discharge to home or other facility with appropriate resources  4/10/2025 0933 by Fadia Kohli RN  Outcome: Progressing  4/10/2025 0221 by Lavinia Puentes RN  Outcome: Progressing     Problem: Pain  Goal: Verbalizes/displays adequate comfort level or baseline comfort level  4/10/2025 0933 by Fadia Kohli RN  Outcome: Progressing  4/10/2025 0221 by Lavinia Puentes RN  Outcome: Progressing     Problem: Cardiovascular - Adult  Goal: Maintains optimal cardiac output and hemodynamic stability  4/10/2025 0933 by Fadia Kohli RN  Outcome: Progressing  4/10/2025 0221 by Lavinia Puentes RN  Outcome: Progressing     Problem: Musculoskeletal - Adult  Goal: Return mobility to safest level of function  4/10/2025 0933 by Fadia Kohli RN  Outcome: Progressing  4/10/2025 0221 by Lavinia Puentes RN  Outcome: Progressing     Problem: Safety - Adult  Goal: Free from fall injury  4/10/2025 0933 by Fadia Kohli RN  Outcome: Progressing  4/10/2025 0221 by Lavinia Puentes RN  Outcome: Progressing

## 2025-04-10 NOTE — DISCHARGE SUMMARY
Discharge Summary   Please note that this dictation was completed with [employer], the computer voice recognition software.  Quite often unanticipated grammatical, syntax, homophones, and other interpretive errors are inadvertently transcribed by the computer software.  Please disregard these errors.  Please excuse any errors that have escaped final proofreading.    PATIENT ID: William Buchanan  MRN: [id number]   YOB: 1955    DATE OF ADMISSION: 4/8/2025  1:51 PM    DATE OF DISCHARGE: 4/10/2025  PRIMARY CARE PROVIDER: Uma Boone MD         ATTENDING PHYSICIAN: Christi Capps MD  DISCHARGING PROVIDER: Christi Capps MD       CONSULTATIONS: IP CONSULT TO HOSPITALIST    PROCEDURES/SURGERIES: * No surgery found *    ADMITTING HPI from excerpted H&P   Patient is a 70-year-old male who is completely independent in the activities of daily life, and lives in his own home. He states that he stopped smoking cigarettes in February 2025 after one pack per day since age 14, approximately 55 years. Patient states that he does not have a nebulizer machine at home, and has never used home oxygen. He states that in February 2025, approximately six weeks ago,  he started having a cough. The cough has been productive with greenish phlegm, but has been improving. He states that he has not been to see a doctor for this cough. He does has not been on any steroids or antibiotics.  He started having shortness of breath 2 to 3 weeks ago. Patient states that he now finds himself short of breath with walking one block on level ground. He states that he does not find himself short of breath, walking around his home from one room to another. Patient denies any swelling in the legs.He states that he may have had some wheezing, but he cannot be sure. Patient states that he has never been diagnosed with asthma.     Patient denies any chest pain.        HOSPITAL COURSE & DISCHARGE DIAGNOSIS/ PLAN:   Ac.Bronchitis/?COPD      Cognition    xx Lucid     Forgetful     Dementia      Catheters/lines (plus indication)    Landaverde     PICC     PEG    xx None      Code status    xx Full code     DNR      PHYSICAL EXAMINATION AT DISCHARGE:    General : alert x 3, awake, no acute distress,   HEENT: PEERL, EOMI, moist mucus membrane,   Neck: supple, no JVD, no meningeal signs  Chest: clear bilaterally.  CVS: S1 S2 heard, Capillary refill less than 2 seconds  Abd: soft/ non tender, non distended, BS physiological,   Ext: no clubbing, no cyanosis, no edema, brisk 2+ DP pulses  Neuro/Psych: pleasant mood and affect, no facial asymmetry.  Nonfocal  Skin: warm    CHRONIC MEDICAL DIAGNOSES:      Greater than 31 minutes were spent with the patient on counseling and coordination of care    Signed:   Christi Capps MD  4/10/2025  10:33 AM

## 2025-04-10 NOTE — PROGRESS NOTES
Comprehensive Nutrition Assessment    Type and Reason for Visit: Initial    Nutrition Recommendations/Plan:   Diet as tolerated       Malnutrition Assessment:  Malnutrition Status:  No malnutrition (04/10/25 0957)           Nutrition Assessment:    PT admitted with cough, ? COPD vs bronchitis vs asthma (pt states he has never been dx'd with asthma.  States he quit smoking in Feb after a 55 year tobaco habit. Other hx notable for HLD    Nutrition Related Findings:    PT tolerating diet; meds reviewed Wound Type: None       Lab Results   Component Value Date/Time     04/09/2025 05:22 AM    K 4.2 04/09/2025 05:22 AM     04/09/2025 05:22 AM    CO2 28 04/09/2025 05:22 AM    BUN 13 04/09/2025 05:22 AM    CREATININE 0.92 04/09/2025 05:22 AM    GLUCOSE 88 04/09/2025 05:22 AM    CALCIUM 8.2 04/09/2025 05:22 AM    PHOS 3.6 04/09/2025 05:22 AM    MG 2.1 04/09/2025 05:22 AM          Estimated Daily Nutrient Needs:  Energy Requirements Based On: Kcal/kg  Weight Used for Energy Requirements: Current  Energy (kcal/day): 1875  Weight Used for Protein Requirements: Current  Protein (g/day): 86  Method Used for Fluid Requirements: 1 ml/kcal  Fluid (ml/day): 1875    Nutrition Related Findings:   Edema: None                    Last BM:  04/09/25    Wounds:   Wound Type: None      Current Nutrition Intake & Therapies:    Average Meal Intake: %  Average Supplements Intake: None Ordered  ADULT DIET; Regular  Meal Intake:   No data found.  Supplement Intake:  No data found.  Nutrition Support: none      Anthropometric Measures:  Height: 160 cm (5' 3\")  Ideal Body Weight (IBW): 124 lbs (56 kg)       Current Body Weight: 66.9 kg (147 lb 7.8 oz), 118.9 % IBW. Weight Source: Standing scale  Current BMI (kg/m2): 26.1        Weight Adjustment For: No Adjustment                 BMI Categories: Overweight (BMI 25.0-29.9)    Wt Readings from Last 10 Encounters:   04/08/25 66.9 kg (147 lb 7.8 oz)   12/10/24 69.9 kg (154 lb)

## 2025-04-10 NOTE — PROGRESS NOTES
Bedside and verbal shift report given to Fadia RN (oncoming nurse) from Lavinia RN(offgoing nurse). Information discussed includes adult overview, MAR and recent lab results. Patient received awake in bed  1000 pt discussed in IDR plan discharge home  1431 pt wheeled to the hospital entrance with his belongings, AVS and picked up by the lift to his home.

## 2025-04-10 NOTE — PLAN OF CARE
Problem: Discharge Planning  Goal: Discharge to home or other facility with appropriate resources  Outcome: Progressing     Problem: Pain  Goal: Verbalizes/displays adequate comfort level or baseline comfort level  Outcome: Progressing     Problem: Respiratory - Adult  Goal: Achieves optimal ventilation and oxygenation  Outcome: Progressing     Problem: Cardiovascular - Adult  Goal: Maintains optimal cardiac output and hemodynamic stability  Outcome: Progressing     Problem: Musculoskeletal - Adult  Goal: Return mobility to safest level of function  Outcome: Progressing     Problem: Safety - Adult  Goal: Free from fall injury  Outcome: Progressing

## 2025-04-11 LAB
M PNEUMO IGG SER IA-ACNC: 816 U/ML (ref 0–99)
M PNEUMO IGM SER IA-ACNC: <770 U/ML (ref 0–769)

## 2025-04-22 ENCOUNTER — APPOINTMENT (OUTPATIENT)
Facility: HOSPITAL | Age: 70
End: 2025-04-22
Payer: COMMERCIAL

## 2025-04-22 ENCOUNTER — HOSPITAL ENCOUNTER (EMERGENCY)
Facility: HOSPITAL | Age: 70
Discharge: HOME | End: 2025-04-22
Attending: EMERGENCY MEDICINE
Payer: COMMERCIAL

## 2025-04-22 VITALS
TEMPERATURE: 97.9 F | HEIGHT: 63 IN | OXYGEN SATURATION: 98 % | HEART RATE: 84 BPM | BODY MASS INDEX: 27.5 KG/M2 | RESPIRATION RATE: 22 BRPM | DIASTOLIC BLOOD PRESSURE: 83 MMHG | WEIGHT: 155.2 LBS | SYSTOLIC BLOOD PRESSURE: 132 MMHG

## 2025-04-22 DIAGNOSIS — J18.9 COMMUNITY ACQUIRED PNEUMONIA OF RIGHT LOWER LOBE OF LUNG: Primary | ICD-10-CM

## 2025-04-22 LAB
ALBUMIN SERPL-MCNC: 3.4 G/DL (ref 3.5–5)
ALBUMIN/GLOB SERPL: 0.8 (ref 1.1–2.2)
ALP SERPL-CCNC: 152 U/L (ref 45–117)
ALT SERPL-CCNC: 16 U/L (ref 12–78)
ANION GAP SERPL CALC-SCNC: 7 MMOL/L (ref 2–12)
AST SERPL-CCNC: 18 U/L (ref 15–37)
BASOPHILS # BLD: 0.01 K/UL (ref 0–0.1)
BASOPHILS NFR BLD: 0.2 % (ref 0–1)
BILIRUB SERPL-MCNC: 0.2 MG/DL (ref 0.2–1)
BUN SERPL-MCNC: 13 MG/DL (ref 6–20)
BUN/CREAT SERPL: 13 (ref 12–20)
CALCIUM SERPL-MCNC: 9.1 MG/DL (ref 8.5–10.1)
CHLORIDE SERPL-SCNC: 104 MMOL/L (ref 97–108)
CO2 SERPL-SCNC: 27 MMOL/L (ref 21–32)
CREAT SERPL-MCNC: 1 MG/DL (ref 0.7–1.3)
D DIMER PPP FEU-MCNC: 0.63 MG/L FEU (ref 0–0.65)
DIFFERENTIAL METHOD BLD: ABNORMAL
EOSINOPHIL # BLD: 0.04 K/UL (ref 0–0.4)
EOSINOPHIL NFR BLD: 0.8 % (ref 0–7)
ERYTHROCYTE [DISTWIDTH] IN BLOOD BY AUTOMATED COUNT: 13.4 % (ref 11.5–14.5)
FLUAV RNA SPEC QL NAA+PROBE: NOT DETECTED
FLUBV RNA SPEC QL NAA+PROBE: NOT DETECTED
GLOBULIN SER CALC-MCNC: 4.1 G/DL (ref 2–4)
GLUCOSE SERPL-MCNC: 85 MG/DL (ref 65–100)
HCT VFR BLD AUTO: 33.9 % (ref 36.6–50.3)
HGB BLD-MCNC: 11.1 G/DL (ref 12.1–17)
IMM GRANULOCYTES # BLD AUTO: 0.02 K/UL (ref 0–0.04)
IMM GRANULOCYTES NFR BLD AUTO: 0.4 % (ref 0–0.5)
LYMPHOCYTES # BLD: 0.87 K/UL (ref 0.8–3.5)
LYMPHOCYTES NFR BLD: 18.5 % (ref 12–49)
MCH RBC QN AUTO: 29.1 PG (ref 26–34)
MCHC RBC AUTO-ENTMCNC: 32.7 G/DL (ref 30–36.5)
MCV RBC AUTO: 88.7 FL (ref 80–99)
MONOCYTES # BLD: 0.66 K/UL (ref 0–1)
MONOCYTES NFR BLD: 14 % (ref 5–13)
NEUTS SEG # BLD: 3.11 K/UL (ref 1.8–8)
NEUTS SEG NFR BLD: 66.1 % (ref 32–75)
NRBC # BLD: 0 K/UL (ref 0–0.01)
NRBC BLD-RTO: 0 PER 100 WBC
PLATELET # BLD AUTO: 208 K/UL (ref 150–400)
PMV BLD AUTO: 10.1 FL (ref 8.9–12.9)
POTASSIUM SERPL-SCNC: 4.3 MMOL/L (ref 3.5–5.1)
PROT SERPL-MCNC: 7.5 G/DL (ref 6.4–8.2)
RBC # BLD AUTO: 3.82 M/UL (ref 4.1–5.7)
SARS-COV-2 RNA RESP QL NAA+PROBE: NOT DETECTED
SODIUM SERPL-SCNC: 138 MMOL/L (ref 136–145)
SOURCE: NORMAL
TROPONIN I SERPL HS-MCNC: 5 NG/L (ref 0–76)
WBC # BLD AUTO: 4.7 K/UL (ref 4.1–11.1)

## 2025-04-22 PROCEDURE — 93005 ELECTROCARDIOGRAM TRACING: CPT | Performed by: EMERGENCY MEDICINE

## 2025-04-22 PROCEDURE — 85379 FIBRIN DEGRADATION QUANT: CPT

## 2025-04-22 PROCEDURE — 99285 EMERGENCY DEPT VISIT HI MDM: CPT

## 2025-04-22 PROCEDURE — 87636 SARSCOV2 & INF A&B AMP PRB: CPT

## 2025-04-22 PROCEDURE — 80053 COMPREHEN METABOLIC PANEL: CPT

## 2025-04-22 PROCEDURE — 85025 COMPLETE CBC W/AUTO DIFF WBC: CPT

## 2025-04-22 PROCEDURE — 71045 X-RAY EXAM CHEST 1 VIEW: CPT

## 2025-04-22 PROCEDURE — 84484 ASSAY OF TROPONIN QUANT: CPT

## 2025-04-23 LAB
EKG ATRIAL RATE: 96 BPM
EKG DIAGNOSIS: NORMAL
EKG P AXIS: 39 DEGREES
EKG P-R INTERVAL: 136 MS
EKG Q-T INTERVAL: 356 MS
EKG QRS DURATION: 86 MS
EKG QTC CALCULATION (BAZETT): 449 MS
EKG R AXIS: 8 DEGREES
EKG T AXIS: 41 DEGREES
EKG VENTRICULAR RATE: 96 BPM

## 2025-04-23 PROCEDURE — 93010 ELECTROCARDIOGRAM REPORT: CPT | Performed by: SPECIALIST

## 2025-04-23 NOTE — ED PROVIDER NOTES
[facility] EMERGENCY DEPARTMENT  EMERGENCY DEPARTMENT ENCOUNTER       Pt Name: Oskar Child Jr.  MRN: [id number]  Birthdate 1955  Date of evaluation: 4/22/2025  Provider: Ric Bardales MD   PCP: No primary care provider on file.  Note Started: 10:45 PM 4/22/25     CHIEF COMPLAINT       Chief Complaint   Patient presents with    Shortness of Breath        HISTORY OF PRESENT ILLNESS: 1 or more elements      History From: Patient, History limited by: None     Oskar Child Jr. is a 70 y.o. male with a history of ascending aneurysm, hypertension, hypercholesterolemia who presents with multiple plaints.  He describes onset of cough, substernal chest discomfort and shortness of breath starting this evening.  No fever chills nausea or vomiting, no diaphoresis.  No recent leg swelling or cramping.  Pain does not radiate.     Nursing Notes were all reviewed and agreed with or any disagreements were addressed in the HPI.     REVIEW OF SYSTEMS        Positives and Pertinent negatives as per HPI.    PAST HISTORY     Past Medical History:  Past Medical History:   Diagnosis Date    High cholesterol     HTN (hypertension)     Leg swelling     Memory disorder     Migraine     1987    Seizure (HCC)     Seizures (HCC)     30 years    Visual disturbance        Past Surgical History:  Past Surgical History:   Procedure Laterality Date    OTHER SURGICAL HISTORY      skull plating- 1987       Family History:  Family History   Problem Relation Age of Onset    Heart Disease Sister     Headache Sister     Heart Disease Mother     Seizures Father        Social History:  Social History     Tobacco Use    Smoking status: Never    Smokeless tobacco: Never   Substance Use Topics    Alcohol use: Not Currently    Drug use: No       Allergies:  No Known Allergies    CURRENT MEDICATIONS      Previous Medications    ASPIRIN 81 MG EC TABLET    81 mg = 1 tab each dose, PO, daily, # 30 tab, 1 Refills, Pharmacy: [facility]

## 2025-04-23 NOTE — ED NOTES
Pt presents ambulatory to ED complaining of SOB, cough, & mid chest pain starting today. Pt is alert and oriented x 4, RR even and unlabored, skin is warm and dry. Assesment completed and pt updated on plan of care.       Emergency Department Nursing Plan of Care       The Nursing Plan of Care is developed from the Nursing assessment and Emergency Department Attending provider initial evaluation.  The plan of care may be reviewed in the “ED Provider note”.    The Plan of Care was developed with the following considerations:   Patient / Family readiness to learn indicated by:verbalized understanding  Persons(s) to be included in education: patient  Barriers to Learning/Limitations:None    Signed     Kmiberly Whitmore RN    4/22/2025   8:54 PM

## 2025-04-23 NOTE — ED TRIAGE NOTES
Pt ambulatory to triage with c/o of shortness of breath that started today with associated chest pain

## 2025-04-23 NOTE — ED NOTES
Discharge instructions were given to the patient by GIL Harris.     The patient left the Emergency Department alert and oriented and in no acute distress with 1 prescription. The patient was encouraged to call or return to the ED for worsening issues or problems and was encouraged to schedule a follow up appointment for continuing care.     Ambulation assessment completed before discharge.  Pt left Emergency Department ambulating at baseline with no ortho devices  Ortho device education: none    The patient verbalized understanding of discharge instructions and prescriptions, all questions were answered. The patient has no further concerns at this time.

## 2025-04-23 NOTE — DISCHARGE INSTRUCTIONS
Thank You!    It was a pleasure taking care of you in our Emergency Department today. We know that when you come to our Emergency Department, you are entrusting us with your health, comfort, and safety. Our physicians and nurses honor that trust, and truly appreciate the opportunity to care for you and your loved ones.      We also value your feedback. If you receive a survey about your Emergency Department experience today, please fill it out.  We care about our patients' feedback, and we listen to what you have to say.  Thank you.    Ric Bardales MD  ________________________________________________________________________  I have included a copy of your lab results and/or radiologic studies from today's visit so you can have them easily available at your follow-up visit. We hope you feel better and please do not hesitate to contact the ED if you have any questions at all!    Recent Results (from the past 12 hours)   EKG 12 Lead    Collection Time: 04/22/25  8:23 PM   Result Value Ref Range    Ventricular Rate 96 BPM    Atrial Rate 96 BPM    P-R Interval 136 ms    QRS Duration 86 ms    Q-T Interval 356 ms    QTc Calculation (Bazett) 449 ms    P Axis 39 degrees    R Axis 8 degrees    T Axis 41 degrees    Diagnosis       Normal sinus rhythm  Normal ECG  When compared with ECG of 13-APR-2023 18:52,  T wave inversion no longer evident in Inferior leads     CBC with Auto Differential    Collection Time: 04/22/25  8:30 PM   Result Value Ref Range    WBC 4.7 4.1 - 11.1 K/uL    RBC 3.82 (L) 4.10 - 5.70 M/uL    Hemoglobin 11.1 (L) 12.1 - 17.0 g/dL    Hematocrit 33.9 (L) 36.6 - 50.3 %    MCV 88.7 80.0 - 99.0 FL    MCH 29.1 26.0 - 34.0 PG    MCHC 32.7 30.0 - 36.5 g/dL    RDW 13.4 11.5 - 14.5 %    Platelets 208 150 - 400 K/uL    MPV 10.1 8.9 - 12.9 FL    Nucleated RBCs 0.0 0  WBC    nRBC 0.00 0.00 - 0.01 K/uL    Neutrophils % 66.1 32.0 - 75.0 %    Lymphocytes % 18.5 12.0 - 49.0 %    Monocytes % 14.0 (H) 5.0 - 13.0 %

## 2025-04-24 NOTE — PROGRESS NOTES
Subjective:   William Buchanan is a 70 y.o.  male with past medical history including unspecified seizure disorder, prior tobacco use, and mild ascending aortic aneurysm (dilated to 4.2 cm in April 2025) who presents today for posthospitalization follow-up.    Mr. Buchanan was hospitalized briefly in April 2025 for poorly articulated dyspnea with exertion and cough (symptoms seem relatively chronic in nature).  He is a limited historian and does not seem to have much insight to his recent hospitalization.    His dyspnea was due to presumed COPD exacerbation.  He had a CTA of his chest that was negative for PE, significant airspace disease, or suspicious pulmonary nodules.  He had incidental findings of mild ascending aortic dilatation, with max diameter measuring 4.2 cm.  Independent review shows moderate multivessel coronary calcifications, predominantly along the LAD and Lcx.  He also had mild to moderate diffuse aortic calcifications.  He had an echocardiogram that demonstrated a mildly dilated ascending aorta, aortic root, and normal-appearing aortic valve with only 1+ AR.    He is not entirely sure why he is here today.  He states that since he left the hospital he has been feeling \"much better\", which he presumes is due to the albuterol he is taking.    He states that he has an extensive family history of heart problems but does not know the details regarding these issues.    He denies any cardiac history himself and states that he has never had any form of stress testing or invasive angiography.    He initially tells me that he never has any complaints with his chest but later in her interview he mentions that he does sometimes get some chest tightness and discomfort with physical activity.  He also has some intermittent dyspnea and coughing with activity.    He has a history of smoking about 55 pack years; he quit smoking altogether in February 2025.    His blood pressure is well-controlled.  Lipids

## 2025-04-25 ENCOUNTER — OFFICE VISIT (OUTPATIENT)
Age: 70
End: 2025-04-25
Payer: MEDICARE

## 2025-04-25 VITALS
HEART RATE: 82 BPM | SYSTOLIC BLOOD PRESSURE: 116 MMHG | HEIGHT: 63 IN | WEIGHT: 148 LBS | OXYGEN SATURATION: 97 % | DIASTOLIC BLOOD PRESSURE: 82 MMHG | BODY MASS INDEX: 26.22 KG/M2

## 2025-04-25 DIAGNOSIS — R07.9 CHEST PAIN, UNSPECIFIED TYPE: ICD-10-CM

## 2025-04-25 DIAGNOSIS — I77.810 MILD ASCENDING AORTA DILATATION: Primary | ICD-10-CM

## 2025-04-25 DIAGNOSIS — I35.1 MILD AI (AORTIC INSUFFICIENCY): ICD-10-CM

## 2025-04-25 DIAGNOSIS — R06.09 DOE (DYSPNEA ON EXERTION): ICD-10-CM

## 2025-04-25 DIAGNOSIS — Z87.891 FORMER TOBACCO USE: ICD-10-CM

## 2025-04-25 PROCEDURE — 1159F MED LIST DOCD IN RCRD: CPT | Performed by: NURSE PRACTITIONER

## 2025-04-25 PROCEDURE — 1123F ACP DISCUSS/DSCN MKR DOCD: CPT | Performed by: NURSE PRACTITIONER

## 2025-04-25 PROCEDURE — 99204 OFFICE O/P NEW MOD 45 MIN: CPT | Performed by: NURSE PRACTITIONER

## 2025-04-25 PROCEDURE — 1126F AMNT PAIN NOTED NONE PRSNT: CPT | Performed by: NURSE PRACTITIONER

## 2025-04-25 RX ORDER — ASPIRIN 81 MG/1
TABLET ORAL
COMMUNITY

## 2025-04-25 RX ORDER — METOPROLOL SUCCINATE 25 MG/1
TABLET, EXTENDED RELEASE ORAL
COMMUNITY

## 2025-04-25 RX ORDER — ALBUTEROL SULFATE 90 UG/1
2 INHALANT RESPIRATORY (INHALATION) 4 TIMES DAILY PRN
Qty: 18 G | Refills: 5 | Status: SHIPPED | OUTPATIENT
Start: 2025-04-25

## 2025-04-25 ASSESSMENT — PATIENT HEALTH QUESTIONNAIRE - PHQ9
SUM OF ALL RESPONSES TO PHQ QUESTIONS 1-9: 0
2. FEELING DOWN, DEPRESSED OR HOPELESS: NOT AT ALL
SUM OF ALL RESPONSES TO PHQ QUESTIONS 1-9: 0
SUM OF ALL RESPONSES TO PHQ QUESTIONS 1-9: 0
1. LITTLE INTEREST OR PLEASURE IN DOING THINGS: NOT AT ALL
SUM OF ALL RESPONSES TO PHQ QUESTIONS 1-9: 0

## 2025-04-25 ASSESSMENT — VISUAL ACUITY: OU: 1

## 2025-04-25 ASSESSMENT — ENCOUNTER SYMPTOMS
CHEST TIGHTNESS: 1
COUGH: 1
SHORTNESS OF BREATH: 1

## 2025-04-25 NOTE — PROGRESS NOTES
1. Have you been to the ER, urgent care clinic since your last visit?  Hospitalized since your last visit? Yes. Was admitted in [facility] from 04/08/25 to 04/10/25 due to Acute asthma exacerbation.    2. Have you seen or consulted any other health care providers outside of the [facility] since your last visit?  Include any pap smears or colon screening. No

## 2025-04-25 NOTE — PATIENT INSTRUCTIONS
It was very nice to meet you today.  We are going to do a stress echocardiogram to see if you have a blockage in your heart.  This is generally done on a treadmill and I will be with you the morning of the test.  Make sure you wear comfortable gym clothing and shoes and be prepared to walk on a treadmill with an incline.    We will also do a test called pulmonary function testing to better understand if you have any lung conditions related to your history of smoking.    I will see you back in 4 months, but please reach out sooner as needed for any questions or concerns

## 2025-04-29 ENCOUNTER — TELEPHONE (OUTPATIENT)
Age: 70
End: 2025-04-29

## 2025-04-29 NOTE — TELEPHONE ENCOUNTER
Diallo with [employer] is calling in regards to they did not receive the order for the CT. Order needs to be faxed to [phone].

## 2025-04-29 NOTE — TELEPHONE ENCOUNTER
Diallo from  returned my call stating he called pt to schedule a stress echo and the pt thought he was also supposed to have a CT of the lungs done.   Per notes Julius Gentile NP did order Pulmonary Function tests and the pt got instructions from this office of which tests to set up.  Note sent to Chula Nguyen for clarification as to whether this pt understood instructions and needs to schedule PFTs.

## 2025-04-29 NOTE — TELEPHONE ENCOUNTER
Spoke with Ary/[employer] regarding message from Diallo requesting this office to fax over an order for a CT- this office has not ordered a CT scan,the last scan that was ordered was by hospitalist when pt was admitted.    Ary took message to have Diallo return my call to clarify message.

## 2025-05-13 ENCOUNTER — APPOINTMENT (OUTPATIENT)
Facility: HOSPITAL | Age: 70
End: 2025-05-13
Payer: MEDICARE

## 2025-05-13 ENCOUNTER — HOSPITAL ENCOUNTER (OUTPATIENT)
Facility: HOSPITAL | Age: 70
Discharge: HOME | End: 2025-05-16

## 2025-05-13 DIAGNOSIS — R07.9 CHEST PAIN, UNSPECIFIED TYPE: ICD-10-CM

## 2025-05-13 DIAGNOSIS — R06.09 DOE (DYSPNEA ON EXERTION): ICD-10-CM

## 2025-05-13 DIAGNOSIS — I35.1 MILD AI (AORTIC INSUFFICIENCY): ICD-10-CM

## 2025-05-13 DIAGNOSIS — I77.810 MILD ASCENDING AORTA DILATATION: ICD-10-CM

## 2025-05-13 RX ORDER — ALBUTEROL SULFATE 90 UG/1
4 INHALANT RESPIRATORY (INHALATION) ONCE
Status: COMPLETED | OUTPATIENT
Start: 2025-05-13 | End: 2025-05-13

## 2025-05-13 RX ADMIN — ALBUTEROL SULFATE 4 PUFF: 90 INHALANT RESPIRATORY (INHALATION) at 10:37

## 2025-06-26 ENCOUNTER — APPOINTMENT (OUTPATIENT)
Facility: HOSPITAL | Age: 70
End: 2025-06-26
Payer: MEDICARE

## 2025-06-26 ENCOUNTER — HOSPITAL ENCOUNTER (EMERGENCY)
Facility: HOSPITAL | Age: 70
Discharge: LEFT BEFORE DISCHARGE | End: 2025-06-26
Payer: MEDICARE

## 2025-06-26 VITALS
WEIGHT: 160 LBS | SYSTOLIC BLOOD PRESSURE: 120 MMHG | DIASTOLIC BLOOD PRESSURE: 77 MMHG | OXYGEN SATURATION: 99 % | HEART RATE: 74 BPM | TEMPERATURE: 98.6 F | HEIGHT: 63 IN | BODY MASS INDEX: 28.35 KG/M2 | RESPIRATION RATE: 19 BRPM

## 2025-06-26 DIAGNOSIS — S61.213A LACERATION OF LEFT MIDDLE FINGER WITHOUT FOREIGN BODY WITHOUT DAMAGE TO NAIL, INITIAL ENCOUNTER: ICD-10-CM
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
## 2025-06-26 DIAGNOSIS — S61.215A LACERATION OF LEFT RING FINGER WITHOUT FOREIGN BODY WITHOUT DAMAGE TO NAIL, INITIAL ENCOUNTER: ICD-10-CM

## 2025-06-26 DIAGNOSIS — M79.642 LEFT HAND PAIN: ICD-10-CM

## 2025-06-26 DIAGNOSIS — S60.512A ABRASION OF LEFT HAND, INITIAL ENCOUNTER: ICD-10-CM

## 2025-06-26 PROCEDURE — 73130 X-RAY EXAM OF HAND: CPT

## 2025-06-26 PROCEDURE — 99284 EMERGENCY DEPT VISIT MOD MDM: CPT

## 2025-06-26 PROCEDURE — 12001 RPR S/N/AX/GEN/TRNK 2.5CM/<: CPT

## 2025-06-26 PROCEDURE — 6370000000 HC RX 637 (ALT 250 FOR IP)

## 2025-06-26 PROCEDURE — 90471 IMMUNIZATION ADMIN: CPT

## 2025-06-26 PROCEDURE — 90715 TDAP VACCINE 7 YRS/> IM: CPT

## 2025-06-26 PROCEDURE — 6360000002 HC RX W HCPCS

## 2025-06-26 RX ORDER — IBUPROFEN 600 MG/1
600 TABLET, FILM COATED ORAL
Status: COMPLETED | OUTPATIENT
Start: 2025-06-26 | End: 2025-06-26

## 2025-06-26 RX ADMIN — IBUPROFEN 600 MG: 600 TABLET ORAL at 10:51

## 2025-06-26 RX ADMIN — TETANUS TOXOID, REDUCED DIPHTHERIA TOXOID AND ACELLULAR PERTUSSIS VACCINE, ADSORBED 0.5 ML: 5; 2.5; 8; 8; 2.5 SUSPENSION INTRAMUSCULAR at 10:51

## 2025-06-26 ASSESSMENT — PAIN DESCRIPTION - DESCRIPTORS: DESCRIPTORS: ACHING

## 2025-06-26 ASSESSMENT — PAIN DESCRIPTION - ORIENTATION: ORIENTATION: LEFT

## 2025-06-26 ASSESSMENT — PAIN DESCRIPTION - LOCATION: LOCATION: FINGER (COMMENT WHICH ONE)

## 2025-06-26 ASSESSMENT — PAIN SCALES - GENERAL: PAINLEVEL_OUTOF10: 2

## 2025-06-26 ASSESSMENT — PAIN - FUNCTIONAL ASSESSMENT: PAIN_FUNCTIONAL_ASSESSMENT: 0-10

## 2025-06-26 NOTE — ED NOTES
Pt presents to ED complaining of left hand injury along his 2nd, 3rd, and 4th middle phalangeal joints after tripping on a curb and falling onto his outstretched hand earlier today. Pt denies head injury and taking medication PTA. Pt is unsure when his last tetanus shot was. Pt is alert and oriented x 4, RR even and unlabored, skin is warm and dry. Pt appears in NAD at this time. Assessment completed and pt updated on plan of care.  Call bell in reach.   Emergency Department Nursing Plan of Care  The Nursing Plan of Care is developed from the Nursing assessment and Emergency Department Attending provider initial evaluation.  The plan of care may be reviewed in the “ED Provider note”.  The Plan of Care was developed with the following considerations:  Patient / Family readiness to learn indicated by:Refer to Medical chart in [facility]  Persons(s) to be included in education: Refer to Medical chart in [facility]  Barriers to Learning/Limitations:Normal

## 2025-06-26 NOTE — ED PROVIDER NOTES
indicated.    The wound was explored with the following results: No foreign bodies found.  The wound was repaired with Dermabond  The wound was closed with good hemostasis and approximation.  Sterile dressing applied.  Lip laceration: Not Applicable  Simple (Single-layer AND simple cleaning)  Estimated blood loss: minimal  The procedure took 1-15 minutes, and patient tolerated well.     CLINICAL MANAGEMENT TOOLS:  Not Applicable      ED Course as of 06/26/25 1303   Thu Jun 26, 2025   1043 Bandage applied to control bleeding in triage. Will obtain XR prior to further examination [JM]   1132 Discussed recommendation for laceration repair on 2 of the injuries on the second and third digits with sutures, but patient states he would prefer skin glue. Discussed benefits of stitches to skin glue in terms of wound healing, but patient would still prefer the glue. Discussed importance of limiting bending of the knuckles to encourage better healing with the glue. Patient expresses understanding. [JM]   1252 Called radiology. States images were read but unable to share results with me as he is with a patient. Unable to view results on my end.  [JM]   1257 Radiology tech reaching out to radiologist about images [JM]   1303 Patient eloped   [JM]      ED Course User Index  [JM] Jocelyn Myles PA-C         Heart Score: n/a  NIHSS: n/a      FINAL IMPRESSION     1. Ground-level fall    2. Abrasion of left hand, initial encounter    3. Left hand pain    4. Laceration of left middle finger without foreign body without damage to nail, initial encounter    5. Laceration of left ring finger without foreign body without damage to nail, initial encounter          DISPOSITION/PLAN   Williamcalvin Buchanan's  results have been reviewed with him.  He has been counseled regarding his diagnosis, treatment, and plan.  He verbally conveys understanding and agreement of the signs, symptoms, diagnosis, treatment and prognosis and additionally agrees to

## 2025-06-26 NOTE — DISCHARGE INSTRUCTIONS
Take ibuprofen and Tylenol in an alternating fashion, taking 1000 milligrams of Tylenol every 8 hours and 600 mg of ibuprofen every 6 hours. Keep wound clean, washing with warm soapy water, and dry. Keep area covered until skin closes over. Follow-up with PCP.  Return to ER with acute worsening symptoms such as chest pain, shortness of breath, abdominal pain, or other acute complaints. Thank you for choosing [facility] to be a part of your care today.

## 2025-08-11 ENCOUNTER — OFFICE VISIT (OUTPATIENT)
Age: 70
End: 2025-08-11
Payer: MEDICARE

## 2025-08-11 VITALS
DIASTOLIC BLOOD PRESSURE: 68 MMHG | WEIGHT: 158.8 LBS | OXYGEN SATURATION: 95 % | SYSTOLIC BLOOD PRESSURE: 106 MMHG | HEIGHT: 63 IN | BODY MASS INDEX: 28.14 KG/M2 | HEART RATE: 76 BPM

## 2025-08-11 DIAGNOSIS — R06.09 DOE (DYSPNEA ON EXERTION): Primary | ICD-10-CM

## 2025-08-11 DIAGNOSIS — E78.00 HYPERCHOLESTEROLEMIA: ICD-10-CM

## 2025-08-11 DIAGNOSIS — I25.118 CORONARY ARTERY DISEASE OF NATIVE ARTERY OF NATIVE HEART WITH STABLE ANGINA PECTORIS: ICD-10-CM

## 2025-08-11 DIAGNOSIS — I77.810 MILD ASCENDING AORTA DILATATION: ICD-10-CM

## 2025-08-11 DIAGNOSIS — I25.10 CORONARY ARTERY DISEASE INVOLVING NATIVE CORONARY ARTERY OF NATIVE HEART, UNSPECIFIED WHETHER ANGINA PRESENT: ICD-10-CM

## 2025-08-11 PROCEDURE — 1159F MED LIST DOCD IN RCRD: CPT | Performed by: NURSE PRACTITIONER

## 2025-08-11 PROCEDURE — 1123F ACP DISCUSS/DSCN MKR DOCD: CPT | Performed by: NURSE PRACTITIONER

## 2025-08-11 PROCEDURE — 1126F AMNT PAIN NOTED NONE PRSNT: CPT | Performed by: NURSE PRACTITIONER

## 2025-08-11 PROCEDURE — 99214 OFFICE O/P EST MOD 30 MIN: CPT | Performed by: NURSE PRACTITIONER

## 2025-08-11 ASSESSMENT — PATIENT HEALTH QUESTIONNAIRE - PHQ9
SUM OF ALL RESPONSES TO PHQ QUESTIONS 1-9: 0
2. FEELING DOWN, DEPRESSED OR HOPELESS: NOT AT ALL
1. LITTLE INTEREST OR PLEASURE IN DOING THINGS: NOT AT ALL
SUM OF ALL RESPONSES TO PHQ QUESTIONS 1-9: 0

## 2025-08-11 ASSESSMENT — LIFESTYLE VARIABLES
HOW MANY STANDARD DRINKS CONTAINING ALCOHOL DO YOU HAVE ON A TYPICAL DAY: PATIENT DOES NOT DRINK
HOW OFTEN DO YOU HAVE A DRINK CONTAINING ALCOHOL: NEVER